# Patient Record
Sex: FEMALE | Race: BLACK OR AFRICAN AMERICAN | NOT HISPANIC OR LATINO | Employment: FULL TIME | URBAN - METROPOLITAN AREA
[De-identification: names, ages, dates, MRNs, and addresses within clinical notes are randomized per-mention and may not be internally consistent; named-entity substitution may affect disease eponyms.]

---

## 2021-03-25 ENCOUNTER — APPOINTMENT (OUTPATIENT)
Dept: GENERAL RADIOLOGY | Facility: HOSPITAL | Age: 51
End: 2021-03-25

## 2021-03-25 ENCOUNTER — HOSPITAL ENCOUNTER (EMERGENCY)
Facility: HOSPITAL | Age: 51
Discharge: HOME | End: 2021-03-25
Admitting: EMERGENCY MEDICINE

## 2021-03-25 VITALS
DIASTOLIC BLOOD PRESSURE: 108 MMHG | WEIGHT: 173.06 LBS | BODY MASS INDEX: 30.66 KG/M2 | SYSTOLIC BLOOD PRESSURE: 174 MMHG | HEIGHT: 63 IN | TEMPERATURE: 97.9 F | OXYGEN SATURATION: 96 % | RESPIRATION RATE: 16 BRPM | HEART RATE: 80 BPM

## 2021-03-25 DIAGNOSIS — M25.531 RIGHT WRIST PAIN: Primary | ICD-10-CM

## 2021-03-25 DIAGNOSIS — I10 HYPERTENSION, UNSPECIFIED TYPE: ICD-10-CM

## 2021-03-25 PROCEDURE — 73110 X-RAY EXAM OF WRIST: CPT

## 2021-03-25 PROCEDURE — 96372 THER/PROPH/DIAG INJ SC/IM: CPT

## 2021-03-25 PROCEDURE — 99283 EMERGENCY DEPT VISIT LOW MDM: CPT

## 2021-03-25 PROCEDURE — 25010000002 KETOROLAC TROMETHAMINE PER 15 MG: Performed by: PHYSICIAN ASSISTANT

## 2021-03-25 RX ORDER — CLONIDINE HYDROCHLORIDE 0.1 MG/1
0.1 TABLET ORAL ONCE
Status: COMPLETED | OUTPATIENT
Start: 2021-03-25 | End: 2021-03-25

## 2021-03-25 RX ORDER — KETOROLAC TROMETHAMINE 15 MG/ML
15 INJECTION, SOLUTION INTRAMUSCULAR; INTRAVENOUS ONCE
Status: COMPLETED | OUTPATIENT
Start: 2021-03-25 | End: 2021-03-25

## 2021-03-25 RX ORDER — NAPROXEN 500 MG/1
500 TABLET ORAL 2 TIMES DAILY PRN
Qty: 14 TABLET | Refills: 0 | Status: SHIPPED | OUTPATIENT
Start: 2021-03-25 | End: 2021-06-11

## 2021-03-25 RX ADMIN — CLONIDINE HYDROCHLORIDE 0.1 MG: 0.1 TABLET ORAL at 15:52

## 2021-03-25 RX ADMIN — KETOROLAC TROMETHAMINE 15 MG: 15 INJECTION, SOLUTION INTRAMUSCULAR; INTRAVENOUS at 15:56

## 2021-03-25 NOTE — ED PROVIDER NOTES
Subjective   Chief Complaint: Wrist pain    Patient is a 50-year-old -American female with history of CVA, asthma, carpal tunnel presents the ER chief complaint of right wrist pain for 1 month.  Patient denies any known falls or trauma or injury.  Patient states that she had carpal tunnel surgery in 2004 by a physician in [place].  Patient states that she has not taken any medication for her pain in the last month.  Patient also states she has not taken her blood pressure medication in the last 3 days.  Patient states the pain is in her wrist and forearm, described as a constant sharp pain that she rates 9/10.  She denies any numbness or tingling.  She denies any weakness.  Patient denies any right shoulder pain, neck pain.  She denies any fever or chills.  Patient denies any chest pain shortness of breath headache or blurry vision.    Location: Right wrist    Quality: Sharp    Duration: 1 month    Timing: Constant    Severity: Moderate    Associated Symptoms: None    PCP: None      History provided by:  Patient      Review of Systems   Constitutional: Negative for chills and fever.   HENT: Negative for sore throat and trouble swallowing.    Respiratory: Negative for shortness of breath and wheezing.    Cardiovascular: Negative for chest pain.   Gastrointestinal: Negative for abdominal pain, diarrhea, nausea and vomiting.   Genitourinary: Negative for dysuria.   Musculoskeletal: Positive for arthralgias.        Right wrist pain   Skin: Negative for color change and rash.   Neurological: Negative for weakness, numbness and headaches.   Psychiatric/Behavioral: Negative for behavioral problems.   All other systems reviewed and are negative.      No past medical history on file.    Allergies   Allergen Reactions   • Codone [Hydrocodone] Anxiety   • Compazine [Prochlorperazine] Anxiety       No past surgical history on file.    No family history on file.    Social History     Socioeconomic History   • Marital  "status:      Spouse name: Not on file   • Number of children: Not on file   • Years of education: Not on file   • Highest education level: Not on file           Objective   Physical Exam  Vitals reviewed.   Constitutional:       Appearance: Normal appearance. She is well-developed. She is not ill-appearing or toxic-appearing.   HENT:      Head: Normocephalic and atraumatic.   Eyes:      Pupils: Pupils are equal, round, and reactive to light.   Cardiovascular:      Rate and Rhythm: Normal rate and regular rhythm.      Heart sounds: Normal heart sounds.   Pulmonary:      Effort: Pulmonary effort is normal. No respiratory distress.      Breath sounds: Normal breath sounds. No wheezing.   Abdominal:      General: Bowel sounds are normal. There is no distension.      Palpations: Abdomen is soft.      Tenderness: There is no abdominal tenderness.   Musculoskeletal:         General: Tenderness present. No swelling or deformity. Normal range of motion.      Right lower leg: No edema.      Left lower leg: No edema.      Comments: Tenderness palpation posterior aspect of right forearm and right wrist with no significant erythema or edema.  Patient full rage of motion of all digits on right hand  Tinel's sign negative, Phalen sign negative   Skin:     General: Skin is warm and dry.      Capillary Refill: Capillary refill takes less than 2 seconds.      Findings: No rash.   Neurological:      General: No focal deficit present.      Mental Status: She is alert and oriented to person, place, and time.   Psychiatric:         Mood and Affect: Mood normal.         Behavior: Behavior normal.         Procedures           ED Course    BP (!) 174/108 (BP Location: Left arm, Patient Position: Sitting)   Pulse 80   Temp 97.9 °F (36.6 °C) (Oral)   Resp 16   Ht 160 cm (63\")   Wt 78.5 kg (173 lb 1 oz)   SpO2 96%   BMI 30.66 kg/m²   Labs Reviewed - No data to display  Medications   ketorolac (TORADOL) injection 15 mg (15 mg " Intramuscular Given 3/25/21 1556)   cloNIDine (CATAPRES) tablet 0.1 mg (0.1 mg Oral Given 3/25/21 1552)     XR Wrist 3+ View Right    Result Date: 3/25/2021  Moderate degenerative change of the distal radial ulnar joint and moderate degenerative narrowing of the radiocarpal joint. No acute right wrist findings.  Electronically Signed By-Latanya Hubbard MD On:3/25/2021 3:45 PM This report was finalized on [id number] by  Latanya Hubbard MD.                                           MDM  Number of Diagnoses or Management Options  Hypertension, unspecified type  Right wrist pain  Diagnosis management comments: MEDICAL DECISION  Epic Chart Review:  Comorbidities: Hypertension, previous CVA, asthma, carpal tunnel  Differentials: Fracture, contusion, tendinitis, carpal tunnel; this list is not all inclusive and does not constitute the entirety of considered causes  Radiology interpretation:  Images reviewed by me and interpreted by radiologist,   X-ray right wrist shows moderate degenerative change of the distal radial ulnar joint and moderate degenerative narrowing of the radiocarpal joint  Lab interpretation: Not warranted    While in the ED appropriate PPE was worn during exam and throughout all encounters with the patient.  Patient had the above evaluation.  Patient given Toradol 15 mg IM for pain.  Patient given clonidine for her hypertension.  Patient's only complaint is right wrist pain, right forearm pain.  There is no obvious swelling, erythema, deformity.  X-ray right wrist is negative for acute osseous abnormalities.  Patient was placed in a wrist cock-up splint.  Patient is neurovascular intact distally post splint placement.  Patient's blood pressure improved to 174/108.  Patient denies any chest pain, shortness of breath, headache dizziness or blurry vision.  Patient states that she has not taken her blood pressure medication in 3 days.  Discussed thoroughly with patient regarding importance of taking blood  pressure medication as prescribed as it can lead to heart disease, stroke, patient verbalizes understanding.  Patient will follow up with primary care and orthopedic specialist for further management evaluation.  Patient discharged with prescription for naproxen for pain.    Discharge plan and instructions were discussed with the patient who verbalized understanding and is in agreement with the plan, all questions were answered at this time.  Patient is aware of signs symptoms that would require immediate return to the emergency room.  Patient understands importance of following up with primary care provider for further evaluation and worsening concerns as well as blood pressure recheck in the next 4 weeks.    Patient remained afebrile, nontoxic-appearing, no acute respiratory distress throughout entire emergency room stay.  Patient was discharged in improved stable condition with an upright steady gait.         Amount and/or Complexity of Data Reviewed  Tests in the radiology section of CPT®: reviewed and ordered    Patient Progress  Patient progress: stable      Final diagnoses:   Right wrist pain   Hypertension, unspecified type       ED Disposition  ED Disposition     ED Disposition Condition Comment    Discharge Stable           PATIENT CONNECTION - [place] [address]  [phone]  Schedule an appointment as soon as possible for a visit in 2 days  As needed, If symptoms worsen    Joseph Alegre MD  [address] [place] [address] [place] [facility]  [phone]    Schedule an appointment as soon as possible for a visit in 2 days  As needed, If symptoms worsen    Trenton Uriostegui MD  [address] [place] [address] [place] [facility]  [phone]    Schedule an appointment as soon as possible for a visit in 2 days  As needed, If symptoms worsen         Medication List      New Prescriptions    naproxen 500 MG EC tablet  Commonly known as: EC NAPROSYN  Take 1 tablet by mouth 2 (Two) Times a Day As  Needed for Mild Pain .           Where to Get Your Medications      These medications were sent to [facility]/pharmacy #[id number] - [place], [place] - [address] [facility] [phone]  - [phone]   [address] [facility] [place] [address]    Hours: 24-hours Phone: [phone]   · naproxen 500 MG EC tablet          Katherin Cline PA  03/25/21 4931

## 2021-03-25 NOTE — ED NOTES
Pt A&Ox 4 with lungs CTA bases. Pt ambulatory at this time.   Discharge instruction given to pt with verbal understanding received.   Pt discharged with education, RX, and personal belongings.        Geovanni Miller, RN  03/25/21 9778

## 2021-03-25 NOTE — DISCHARGE INSTRUCTIONS
Take naproxen as needed for pain.  Wear wrist cock-up splint during activity, and especially at night  make sure to take your blood pressure medication as prescribed, your blood pressure was very high today    May apply ice to your arm or wrist to help with pain and swelling. Apply in 20-minute increments every few hours while awake    follow-up with primary care for further management evaluation, call one of the providers listed above to establish  follow-up with orthopedic specialist for further management and evaluation    turn to the ER for new or worsening symptoms

## 2021-03-25 NOTE — ED NOTES
C/o right wrist pain radiating up to her right elbow x 1 month, worse in the last week. Hx of carpal tunnel surgery in 2004.      Katherin Levine RN  03/25/21 152

## 2021-06-11 ENCOUNTER — OFFICE VISIT (OUTPATIENT)
Dept: FAMILY MEDICINE CLINIC | Facility: CLINIC | Age: 51
End: 2021-06-11

## 2021-06-11 ENCOUNTER — LAB (OUTPATIENT)
Dept: FAMILY MEDICINE CLINIC | Facility: CLINIC | Age: 51
End: 2021-06-11

## 2021-06-11 VITALS
WEIGHT: 180 LBS | HEIGHT: 63 IN | DIASTOLIC BLOOD PRESSURE: 95 MMHG | SYSTOLIC BLOOD PRESSURE: 146 MMHG | OXYGEN SATURATION: 99 % | BODY MASS INDEX: 31.89 KG/M2 | HEART RATE: 77 BPM

## 2021-06-11 DIAGNOSIS — I10 ESSENTIAL HYPERTENSION: Primary | ICD-10-CM

## 2021-06-11 DIAGNOSIS — Z11.59 NEED FOR HEPATITIS C SCREENING TEST: ICD-10-CM

## 2021-06-11 DIAGNOSIS — E66.09 CLASS 1 OBESITY DUE TO EXCESS CALORIES WITH SERIOUS COMORBIDITY AND BODY MASS INDEX (BMI) OF 31.0 TO 31.9 IN ADULT: ICD-10-CM

## 2021-06-11 PROBLEM — F32.9 MAJOR DEPRESSIVE DISORDER: Status: ACTIVE | Noted: 2017-01-04

## 2021-06-11 PROBLEM — G40.409 MYOCLONIC EPILEPSY: Status: ACTIVE | Noted: 2019-02-18

## 2021-06-11 PROBLEM — M70.62 TROCHANTERIC BURSITIS, LEFT HIP: Status: ACTIVE | Noted: 2018-04-09

## 2021-06-11 PROBLEM — M65.4 RADIAL STYLOID TENOSYNOVITIS OF RIGHT HAND: Status: ACTIVE | Noted: 2021-06-11

## 2021-06-11 PROBLEM — E66.9 OBESITY: Status: ACTIVE | Noted: 2020-09-19

## 2021-06-11 PROBLEM — L65.9 ALOPECIA: Status: ACTIVE | Noted: 2019-02-18

## 2021-06-11 PROBLEM — J45.909 ASTHMA: Status: ACTIVE | Noted: 2021-06-11

## 2021-06-11 LAB
ALBUMIN SERPL-MCNC: 4.4 G/DL (ref 3.5–5.2)
ALBUMIN/GLOB SERPL: 1.2 G/DL
ALP SERPL-CCNC: 68 U/L (ref 39–117)
ALT SERPL W P-5'-P-CCNC: 10 U/L (ref 1–33)
ANION GAP SERPL CALCULATED.3IONS-SCNC: 11.6 MMOL/L (ref 5–15)
AST SERPL-CCNC: 18 U/L (ref 1–32)
BASOPHILS # BLD AUTO: 0.02 10*3/MM3 (ref 0–0.2)
BASOPHILS NFR BLD AUTO: 0.3 % (ref 0–1.5)
BILIRUB SERPL-MCNC: 0.3 MG/DL (ref 0–1.2)
BUN SERPL-MCNC: 12 MG/DL (ref 6–20)
BUN/CREAT SERPL: 16.4 (ref 7–25)
CALCIUM SPEC-SCNC: 9.3 MG/DL (ref 8.6–10.5)
CHLORIDE SERPL-SCNC: 102 MMOL/L (ref 98–107)
CHOLEST SERPL-MCNC: 204 MG/DL (ref 0–200)
CO2 SERPL-SCNC: 27.4 MMOL/L (ref 22–29)
CREAT SERPL-MCNC: 0.73 MG/DL (ref 0.57–1)
DEPRECATED RDW RBC AUTO: 44.8 FL (ref 37–54)
EOSINOPHIL # BLD AUTO: 0.07 10*3/MM3 (ref 0–0.4)
EOSINOPHIL NFR BLD AUTO: 1.1 % (ref 0.3–6.2)
ERYTHROCYTE [DISTWIDTH] IN BLOOD BY AUTOMATED COUNT: 12 % (ref 12.3–15.4)
GFR SERPL CREATININE-BSD FRML MDRD: 103 ML/MIN/1.73
GLOBULIN UR ELPH-MCNC: 3.8 GM/DL
GLUCOSE SERPL-MCNC: 91 MG/DL (ref 65–99)
HCT VFR BLD AUTO: 35.2 % (ref 34–46.6)
HCV AB SER DONR QL: NORMAL
HDLC SERPL-MCNC: 72 MG/DL (ref 40–60)
HGB BLD-MCNC: 12.1 G/DL (ref 12–15.9)
IMM GRANULOCYTES # BLD AUTO: 0.03 10*3/MM3 (ref 0–0.05)
IMM GRANULOCYTES NFR BLD AUTO: 0.5 % (ref 0–0.5)
LDLC SERPL CALC-MCNC: 119 MG/DL (ref 0–100)
LDLC/HDLC SERPL: 1.63 {RATIO}
LYMPHOCYTES # BLD AUTO: 2.66 10*3/MM3 (ref 0.7–3.1)
LYMPHOCYTES NFR BLD AUTO: 42.5 % (ref 19.6–45.3)
MCH RBC QN AUTO: 34.9 PG (ref 26.6–33)
MCHC RBC AUTO-ENTMCNC: 34.4 G/DL (ref 31.5–35.7)
MCV RBC AUTO: 101.4 FL (ref 79–97)
MONOCYTES # BLD AUTO: 0.43 10*3/MM3 (ref 0.1–0.9)
MONOCYTES NFR BLD AUTO: 6.9 % (ref 5–12)
NEUTROPHILS NFR BLD AUTO: 3.05 10*3/MM3 (ref 1.7–7)
NEUTROPHILS NFR BLD AUTO: 48.7 % (ref 42.7–76)
NRBC BLD AUTO-RTO: 0 /100 WBC (ref 0–0.2)
PLATELET # BLD AUTO: 215 10*3/MM3 (ref 140–450)
PMV BLD AUTO: 11.7 FL (ref 6–12)
POTASSIUM SERPL-SCNC: 3.6 MMOL/L (ref 3.5–5.2)
PROT SERPL-MCNC: 8.2 G/DL (ref 6–8.5)
RBC # BLD AUTO: 3.47 10*6/MM3 (ref 3.77–5.28)
SODIUM SERPL-SCNC: 141 MMOL/L (ref 136–145)
TRIGL SERPL-MCNC: 75 MG/DL (ref 0–150)
VLDLC SERPL-MCNC: 13 MG/DL (ref 5–40)
WBC # BLD AUTO: 6.26 10*3/MM3 (ref 3.4–10.8)

## 2021-06-11 PROCEDURE — 85025 COMPLETE CBC W/AUTO DIFF WBC: CPT | Performed by: FAMILY MEDICINE

## 2021-06-11 PROCEDURE — 80053 COMPREHEN METABOLIC PANEL: CPT | Performed by: FAMILY MEDICINE

## 2021-06-11 PROCEDURE — 80061 LIPID PANEL: CPT | Performed by: FAMILY MEDICINE

## 2021-06-11 PROCEDURE — 86803 HEPATITIS C AB TEST: CPT | Performed by: FAMILY MEDICINE

## 2021-06-11 PROCEDURE — 36415 COLL VENOUS BLD VENIPUNCTURE: CPT | Performed by: FAMILY MEDICINE

## 2021-06-11 PROCEDURE — 99203 OFFICE O/P NEW LOW 30 MIN: CPT | Performed by: FAMILY MEDICINE

## 2021-06-11 RX ORDER — AMLODIPINE BESYLATE 10 MG/1
10 TABLET ORAL DAILY
Qty: 90 TABLET | Refills: 1 | Status: SHIPPED | OUTPATIENT
Start: 2021-06-11 | End: 2022-08-09 | Stop reason: SDUPTHER

## 2021-06-11 RX ORDER — AMLODIPINE BESYLATE 10 MG/1
TABLET ORAL
COMMUNITY
Start: 2021-04-29 | End: 2021-06-11 | Stop reason: SDUPTHER

## 2021-06-11 RX ORDER — HYDROCHLOROTHIAZIDE 25 MG/1
25 TABLET ORAL DAILY
Qty: 90 TABLET | Refills: 1 | Status: SHIPPED | OUTPATIENT
Start: 2021-06-11 | End: 2021-11-12

## 2021-06-11 NOTE — PROGRESS NOTES
"  Subjective:     Maira Benson is a 51 y.o. female who presents for  Chief Complaint   Patient presents with   • Hypertension   • Establish Care     Obese black female with CMHx of HTN presents to [facility] care. Hypertensive in office despite amlodipine 10 mg. Reports a history of hypertensive encephalopathy requiring hospitalization.     Past Medical Hx:  No past medical history on file.    Past Surgical Hx:  Past Surgical History:   Procedure Laterality Date   • ANKLE SURGERY     • CARPAL TUNNEL RELEASE     • GLAUCOMA SURGERY     • TUBAL ABDOMINAL LIGATION         Social History:  she  reports that she has been smoking cigars. She has never used smokeless tobacco. She reports current alcohol use. She reports that she does not use drugs.    Current Meds:    Current Outpatient Medications:   •  amLODIPine (NORVASC) 10 MG tablet, , Disp: , Rfl:       Review of Systems  Review of Systems    Objective:     /95 (BP Location: Left arm, Patient Position: Sitting, Cuff Size: Large Adult)   Pulse 77   Ht 160 cm (63\")   Wt 81.6 kg (180 lb)   SpO2 99%   BMI 31.89 kg/m²     Physical Exam  Vitals reviewed.   Constitutional:       General: She is not in acute distress.     Appearance: She is well-developed. She is obese. She is not diaphoretic.   HENT:      Head: Normocephalic and atraumatic.   Cardiovascular:      Rate and Rhythm: Normal rate and regular rhythm.   Pulmonary:      Effort: Pulmonary effort is normal.      Breath sounds: Normal breath sounds.   Musculoskeletal:      Right lower leg: No edema.      Left lower leg: No edema.   Skin:     General: Skin is warm and dry.   Neurological:      Mental Status: She is alert and oriented to person, place, and time.   Psychiatric:         Mood and Affect: Mood normal.         Behavior: Behavior normal.         Lab Results   Component Value Date    WBC 7.08 09/19/2020    HGB 11.9 (L) 09/19/2020    HCT 36.5 09/19/2020    .8 (H) 09/19/2020     09/19/2020 "     Lab Results   Component Value Date    BUN 9 09/19/2020    CREATININE 0.70 09/19/2020    BCR 12.9 09/19/2020    K 3.8 09/19/2020    CO2 29 09/19/2020    CALCIUM 9.3 09/19/2020    ALBUMIN 4.5 09/19/2020    LABIL2 1.1 09/19/2020    AST 23 09/19/2020    ALT 13 09/19/2020     No results found for: CHOL, CHLPL, TRIG, HDL, LDL, LDLDIRECT    The ASCVD Risk score (Catharpin SERGIO Jr., et al., 2013) failed to calculate for the following reasons:    Cannot find a previous HDL lab    Cannot find a previous total cholesterol lab     Assessment/Plan:     Problem List Items Addressed This Visit        Cardiac and Vasculature    Essential hypertension - Primary    Overview     BP not at goal, <140/90.  Encouraged low-Na+ diet & 150 min exercise/week.   Continue amlodipine 10 mg.  Start hydrochlorothiazide 25 mg.  Monitoring renal function.         Relevant Medications    hydroCHLOROthiazide (HYDRODIURIL) 25 MG tablet    amLODIPine (NORVASC) 10 MG tablet    Other Relevant Orders    CBC Auto Differential    Comprehensive Metabolic Panel    Lipid Panel       Endocrine and Metabolic    Class 1 obesity due to excess calories with serious comorbidity and body mass index (BMI) of 31.0 to 31.9 in adult    Overview     Discussed health risks associated with obesity.  Encouraged 150 minutes of exercise weekly.           Other Visit Diagnoses     Need for hepatitis C screening test        Relevant Orders    Hepatitis C Antibody           Follow-up:     Return in about 8 weeks (around 8/6/2021) for Annual Physical Exam, Pap Smear.

## 2021-06-11 NOTE — PATIENT INSTRUCTIONS
Mediterranean Diet  A Mediterranean diet refers to food and lifestyle choices that are based on the traditions of countries located on the [place]. This way of eating has been shown to help prevent certain conditions and improve outcomes for people who have chronic diseases, like kidney disease and heart disease.  What are tips for following this plan?  Lifestyle  · Cook and eat meals together with your family, when possible.  · Drink enough fluid to keep your urine clear or pale yellow.  · Be physically active every day. This includes:  ? Aerobic exercise like running or swimming.  ? Leisure activities like gardening, walking, or housework.  · Get 7-8 hours of sleep each night.  · If recommended by your health care provider, drink red wine in moderation. This means 1 glass a day for nonpregnant women and 2 glasses a day for men. A glass of wine equals 5 oz (150 mL).  Reading food labels    · Check the serving size of packaged foods. For foods such as rice and pasta, the serving size refers to the amount of cooked product, not dry.  · Check the total fat in packaged foods. Avoid foods that have saturated fat or trans fats.  · Check the ingredients list for added sugars, such as corn syrup.  Shopping  · At the grocery store, buy most of your food from the areas near the walls of the store. This includes:  ? Fresh fruits and vegetables (produce).  ? Grains, beans, nuts, and seeds. Some of these may be available in unpackaged forms or large amounts (in bulk).  ? Fresh seafood.  ? Poultry and eggs.  ? Low-fat dairy products.  · Buy whole ingredients instead of prepackaged foods.  · Buy fresh fruits and vegetables in-season from local farmers markets.  · Buy frozen fruits and vegetables in resealable bags.  · If you do not have access to quality fresh seafood, buy precooked frozen shrimp or canned fish, such as tuna, salmon, or sardines.  · Buy small amounts of raw or cooked vegetables, salads, or olives from  the deli or salad bar at your store.  · Stock your pantry so you always have certain foods on hand, such as olive oil, canned tuna, canned tomatoes, rice, pasta, and beans.  Cooking  · Cook foods with extra-virgin olive oil instead of using butter or other vegetable oils.  · Have meat as a side dish, and have vegetables or grains as your main dish. This means having meat in small portions or adding small amounts of meat to foods like pasta or stew.  · Use beans or vegetables instead of meat in common dishes like chili or lasagna.  · [place] with different cooking methods. Try roasting or broiling vegetables instead of steaming or sautéeing them.  · Add frozen vegetables to soups, stews, pasta, or rice.  · Add nuts or seeds for added healthy fat at each meal. You can add these to yogurt, salads, or vegetable dishes.  · Marinate fish or vegetables using olive oil, lemon juice, garlic, and fresh herbs.  Meal planning    · Plan to eat 1 vegetarian meal one day each week. Try to work up to 2 vegetarian meals, if possible.  · Eat seafood 2 or more times a week.  · Have healthy snacks readily available, such as:  ? Vegetable sticks with hummus.  ? Greek yogurt.  ? Fruit and nut trail mix.  · Eat balanced meals throughout the week. This includes:  ? Fruit: 2-3 servings a day  ? Vegetables: 4-5 servings a day  ? Low-fat dairy: 2 servings a day  ? Fish, poultry, or lean meat: 1 serving a day  ? Beans and legumes: 2 or more servings a week  ? Nuts and seeds: 1-2 servings a day  ? Whole grains: 6-8 servings a day  ? Extra-virgin olive oil: 3-4 servings a day  · Limit red meat and sweets to only a few servings a month  What are my food choices?  · Mediterranean diet  ? Recommended  § Grains: Whole-grain pasta. Brown rice. Bulgar wheat. Polenta. Couscous. Whole-wheat bread. Oatmeal. Quinoa.  § Vegetables: Artichokes. Beets. Broccoli. Cabbage. Carrots. Eggplant. Green beans. Chard. Kale. Spinach. Onions. Leeks. Peas. Squash.  Tomatoes. Peppers. Radishes.  § Fruits: Apples. Apricots. Avocado. Berries. Bananas. Cherries. Dates. Figs. Grapes. John. Melon. Oranges. Peaches. Plums. Pomegranate.  § Meats and other protein foods: Beans. Almonds. Sunflower seeds. Pine nuts. Peanuts. Cod. [place]. Scallops. Shrimp. Tuna. Tilapia. Clams. Oysters. Eggs.  § Dairy: Low-fat milk. Cheese. Greek yogurt.  § Beverages: Water. Red wine. Herbal tea.  § Fats and oils: Extra virgin olive oil. Avocado oil. Grape seed oil.  § Sweets and desserts: Greek yogurt with honey. Baked apples. Poached pears. Trail mix.  § Seasoning and other foods: Basil. Cilantro. Coriander. Cumin. Mint. Parsley. [place]obi. Rosemary. Tarragon. Garlic. Oregano. Thyme. Pepper. Balsalmic vinegar. Tahini. Hummus. Tomato sauce. Olives. Mushrooms.  ? Limit these  § Grains: Prepackaged pasta or rice dishes. Prepackaged cereal with added sugar.  § Vegetables: Deep fried potatoes (french fries).  § Fruits: Fruit canned in syrup.  § Meats and other protein foods: Beef. Pork. Lamb. Poultry with skin. Hot dogs. Schmid.  § Dairy: Ice cream. Sour cream. Whole milk.  § Beverages: Juice. Sugar-sweetened soft drinks. Beer. Liquor and spirits.  § Fats and oils: Butter. Canola oil. Vegetable oil. Beef fat (tallow). Lard.  § Sweets and desserts: Cookies. Cakes. Pies. Candy.  § Seasoning and other foods: Mayonnaise. Premade sauces and marinades.  The items listed may not be a complete list. Talk with your dietitian about what dietary choices are right for you.  Summary  · The Mediterranean diet includes both food and lifestyle choices.  · Eat a variety of fresh fruits and vegetables, beans, nuts, seeds, and whole grains.  · Limit the amount of red meat and sweets that you eat.  · Talk with your health care provider about whether it is safe for you to drink red wine in moderation. This means 1 glass a day for nonpregnant women and 2 glasses a day for men. A glass of wine equals 5 oz (150 mL).  This information  is not intended to replace advice given to you by your health care provider. Make sure you discuss any questions you have with your health care provider.  Document Revised: 08/17/2017 Document Reviewed: 08/10/2017  [employer] Patient Education © 2020 [employer]      Exercising to Stay Healthy  To become healthy and stay healthy, it is recommended that you do moderate-intensity and vigorous-intensity exercise. You can tell that you are exercising at a moderate intensity if your heart starts beating faster and you start breathing faster but can still hold a conversation. You can tell that you are exercising at a vigorous intensity if you are breathing much harder and faster and cannot hold a conversation while exercising.  Exercising regularly is important. It has many health benefits, such as:  · Improving overall fitness, flexibility, and endurance.  · Increasing bone density.  · Helping with weight control.  · Decreasing body fat.  · Increasing muscle strength.  · Reducing stress and tension.  · Improving overall health.  How often should I exercise?  Choose an activity that you enjoy, and set realistic goals. Your health care provider can help you make an activity plan that works for you.  Exercise regularly as told by your health care provider. This may include:  · Doing strength training two times a week, such as:  ? Lifting weights.  ? Using resistance bands.  ? Push-ups.  ? Sit-ups.  ? Yoga.  · Doing a certain intensity of exercise for a given amount of time. Choose from these options:  ? A total of 150 minutes of moderate-intensity exercise every week.  ? A total of 75 minutes of vigorous-intensity exercise every week.  ? A mix of moderate-intensity and vigorous-intensity exercise every week.  Children, pregnant women, people who have not exercised regularly, people who are overweight, and older adults may need to talk with a health care provider about what activities are safe to do. If you have a medical  condition, be sure to talk with your health care provider before you start a new exercise program.  What are some exercise ideas?  Moderate-intensity exercise ideas include:  · Walking 1 mile (1.6 km) in about 15 minutes.  · Biking.  · Hiking.  · Golfing.  · Dancing.  · Water aerobics.  Vigorous-intensity exercise ideas include:  · Walking 4.5 miles (7.2 km) or more in about 1 hour.  · Jogging or running 5 miles (8 km) in about 1 hour.  · Biking 10 miles (16.1 km) or more in about 1 hour.  · Lap swimming.  · Roller-skating or in-line skating.  · Cross-country skiing.  · Vigorous competitive sports, such as football, basketball, and soccer.  · Jumping rope.  · Aerobic dancing.  What are some everyday activities that can help me to get exercise?  · Yard work, such as:  ? Pushing a .  ? Raking and bagging leaves.  · Washing your car.  · Pushing a stroller.  · Shoveling snow.  · Gardening.  · Washing windows or floors.  How can I be more active in my day-to-day activities?  · Use stairs instead of an elevator.  · Take a walk during your lunch break.  · If you drive, park your car farther away from your work or school.  · If you take public transportation, get off one stop early and walk the rest of the way.  · Stand up or walk around during all of your indoor phone calls.  · Get up, stretch, and walk around every 30 minutes throughout the day.  · Enjoy exercise with a friend. Support to continue exercising will help you keep a regular routine of activity.  What guidelines can I follow while exercising?  · Before you start a new exercise program, talk with your health care provider.  · Do not exercise so much that you hurt yourself, feel dizzy, or get very short of breath.  · Wear comfortable clothes and wear shoes with good support.  · Drink plenty of water while you exercise to prevent dehydration or heat stroke.  · Work out until your breathing and your heartbeat get faster.  Where to find more  information  · [place] Department of Health and Human Services: www.hhs.gov  · Centers for Disease Control and Prevention (CDC): www.cdc.gov  Summary  · Exercising regularly is important. It will improve your overall fitness, flexibility, and endurance.  · Regular exercise also will improve your overall health. It can help you control your weight, reduce stress, and improve your bone density.  · Do not exercise so much that you hurt yourself, feel dizzy, or get very short of breath.  · Before you start a new exercise program, talk with your health care provider.  This information is not intended to replace advice given to you by your health care provider. Make sure you discuss any questions you have with your health care provider.  Document Revised: 11/30/2018 Document Reviewed: 11/08/2018  [employer] Patient Education © 2021 [employer]       ADVANCE CARE PLANNING  Conversations that Matter  PLANNING GUIDE    LOOKING BACK ...  Who we are, what we believe, and what we value are all shaped by experiences we have had. Taoist, family traditions, jobs and friends affect us deeply.    Has anything happened in your past that shaped your feelings about medical treatment?    Think about an experience you may have had with a family member or friend who was faced with a decision about medical care near the end of life. What was positive about that experience? What do you wish would have been done differently?    HERE AND NOW ...  Do you have any significant health problems now? What kinds of things bring you such curtis that, should a health problem prevent you from doing them any more, life would have little meaning? What short- or long-term goals do you have? How might medical treatment help you or hinder you in accomplishing those goals?    WHAT ABOUT TOMORROW?  What significant health problems do you fear may affect you in the future? How do you feel about the possibility of having to go to a nursing home? How would decisions  "be made if you could not make them?    WHO SHOULD MAKE DECISIONS?  An important part of planning is to consider whether or not you could appoint someone to make your healthcare decisions if you could not make them yourself. Many people select a close family member, but you are free to pick anyone you think could best represent you. Whoever you appoint should have all of the following qualifications:    • Can be trusted.  • Is willing to accept this responsibility.  • Is willing to follow the values and instructions you have discussed.  • Is able to make complex, difficult decisions.    It is helpful, but not required, to appoint one or more alternate persons in case your first choice becomes unable or unwilling to represent you. It is best if only one person has authority at a time, but you can instruct your representatives to discuss decisions together if time permits.    WHAT FUTURE DECISIONS NEED TO BE CONSIDERED?  Providing instructions for future healthcare decisions may seem like an impossible task. How can anyone plan for all the possibilities? You cannot … but you do not have to.    You need to plan for situations where you:  1. Become unexpectedly incapable of making your own decisions  2. It is clear you will have little or no recovery, and  3. The injury or loss of function is significant.    Such a situation might arise because of an injury to the brain from an accident, a stroke, or a slowly progressive disease such as Alzheimer's.    To plan for this type of situation, many people state, \"If I'm going to be a vegetable, let me go,\" or \"No heroics,\" or \"Don't keep me alive on machines.\" While these remarks are a beginning, they simply are too vague to guide decision-making.    You need to completely describe under what circumstances your goals for medical care should be changed from attempting to prolong life to being allowed to die. In some situations, certain treatments may not make sense because they " will not help, but other treatments will be of important benefit.    Consider these three questions:  1. When would it make sense to continue certain treatments in an effort to prolong life and seek recovery?  2. When would it make sense to stop or withhold certain treatments and accept death when it comes?  3. Under any circumstance, what kind of comfort care would you want, including medication, spiritual and environmental options?    Making these choices requires understanding the information, weighing the benefits and burdens from your perspective, and then discussing your choices with those closest to you.    WHAT'S NEXT?  How do you make sure that your choices are respected? First talk, about them with your family, friends, clergy and physician, then put your choices in writing. Information about putting your plans into writing -- in an advance directive -- is available from your healthcare organization or .    Do you have any significant health problems? What health problems do you fear in the future?     Consider what frightens you most about medical treatment. What role does Rest[place], safia or spirituality play in how you live your life? How does cost influence your decisions about medical care?   In terms of future medical care, under what circumstances would you want the goals of medical treatment to switch from attempting to prolong life to focusing on comfort?     Describe these circumstances in as much detail as possible.   Ask yourself, what will most help me live well at this point in my life?     Share your views with the person or people who would make your medical decisions if you could not make them.     THERE'S NO EASY WAY TO PLAN FOR FUTURE HEALTHCARE CHOICES.  It's a process that involves thinking and talking about complex and sensitive issues.    The questions that follow will help in the advance care planning process. This is a guide for your own benefit; it's not a test, and there  are no right or wrong answers. It does not need to be completed all at once. You may use it to share your feelings with healthcare providers, your family and your friends. The answers to these questions will help those you love make choices for you when you cannot make them yourself.    These are things I need to tell my loved ones:  What is your idea of comfort care? Describe how you would want medications to be used to provide comfort. What type of spiritual care would you want?     I need to learn about: ____________________________  I need to ask my healthcare provider: ________________

## 2021-06-14 DIAGNOSIS — E78.2 MIXED HYPERLIPIDEMIA: Primary | ICD-10-CM

## 2021-06-14 RX ORDER — ATORVASTATIN CALCIUM 20 MG/1
20 TABLET, FILM COATED ORAL NIGHTLY
Qty: 90 TABLET | Refills: 1 | Status: SHIPPED | OUTPATIENT
Start: 2021-06-14 | End: 2021-11-12 | Stop reason: SINTOL

## 2021-11-12 ENCOUNTER — TELEPHONE (OUTPATIENT)
Dept: FAMILY MEDICINE CLINIC | Facility: CLINIC | Age: 51
End: 2021-11-12

## 2021-11-12 ENCOUNTER — OFFICE VISIT (OUTPATIENT)
Dept: FAMILY MEDICINE CLINIC | Facility: CLINIC | Age: 51
End: 2021-11-12

## 2021-11-12 VITALS
BODY MASS INDEX: 33.66 KG/M2 | HEIGHT: 63 IN | DIASTOLIC BLOOD PRESSURE: 103 MMHG | TEMPERATURE: 98 F | SYSTOLIC BLOOD PRESSURE: 175 MMHG | WEIGHT: 190 LBS | HEART RATE: 76 BPM | OXYGEN SATURATION: 96 %

## 2021-11-12 DIAGNOSIS — I10 ESSENTIAL HYPERTENSION: Primary | ICD-10-CM

## 2021-11-12 DIAGNOSIS — E78.2 MIXED HYPERLIPIDEMIA: ICD-10-CM

## 2021-11-12 DIAGNOSIS — E66.09 CLASS 1 OBESITY DUE TO EXCESS CALORIES WITH SERIOUS COMORBIDITY AND BODY MASS INDEX (BMI) OF 31.0 TO 31.9 IN ADULT: ICD-10-CM

## 2021-11-12 PROCEDURE — 99214 OFFICE O/P EST MOD 30 MIN: CPT | Performed by: FAMILY MEDICINE

## 2021-11-12 RX ORDER — CHLORTHALIDONE 25 MG/1
25 TABLET ORAL DAILY
Qty: 90 TABLET | Refills: 1 | Status: SHIPPED | OUTPATIENT
Start: 2021-11-12 | End: 2022-03-23 | Stop reason: SDUPTHER

## 2021-11-12 RX ORDER — ROSUVASTATIN CALCIUM 5 MG/1
5 TABLET, COATED ORAL NIGHTLY
Qty: 90 TABLET | Refills: 1 | Status: SHIPPED | OUTPATIENT
Start: 2021-11-12 | End: 2022-08-15 | Stop reason: SDUPTHER

## 2021-11-12 NOTE — TELEPHONE ENCOUNTER
Caller: Maira Benson    Relationship: Self    Best call back number: [phone]    What form or medical record are you requesting: DOCTORS NOTE    Who is requesting this form or medical record from you: WORK    How would you like to receive the form or medical records (pick-up, mail, fax): PATIENT WOULD LIKE TO  TODAY.     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    PATIENT WOULD LIKE TO  TODAY BECAUSE SHE HAS TO WORK TOMORROW    PLEASE ADVISE PATIENT

## 2021-11-12 NOTE — TELEPHONE ENCOUNTER
THERE IS NO HUB TO READ MESSAGE FOR ME TO READ TO THE PATIENT.  SHE IS RETURNING A CALL TO THE OFFICE, I ATTEMPTED TO CONNECT HER BUT NO ONE ANSWERED, SHE WANTS TO BE CALLED BACK AT  [phone]

## 2021-11-12 NOTE — TELEPHONE ENCOUNTER
Please call patient and let her know that I cannot write this letter.    She does not have any mental or physical disability keeping her from wearing a mask.

## 2021-11-12 NOTE — TELEPHONE ENCOUNTER
There is a hub to read message in previous note.   Spoke with patient. Pt notified. She has appt today at 2:00.

## 2021-11-12 NOTE — PATIENT INSTRUCTIONS
"   DASH Eating Plan  DASH stands for Dietary Approaches to Stop Hypertension. The DASH eating plan is a healthy eating plan that has been shown to:  · Reduce high blood pressure (hypertension).  · Reduce your risk for type 2 diabetes, heart disease, and stroke.  · Help with weight loss.  What are tips for following this plan?  Reading food labels  · Check food labels for the amount of salt (sodium) per serving. Choose foods with less than 5 percent of the Daily Value of sodium. Generally, foods with less than 300 milligrams (mg) of sodium per serving fit into this eating plan.  · To find whole grains, look for the word \"whole\" as the first word in the ingredient list.  Shopping  · Buy products labeled as \"low-sodium\" or \"no salt added.\"  · Buy fresh foods. Avoid canned foods and pre-made or frozen meals.  Cooking  · Avoid adding salt when cooking. Use salt-free seasonings or herbs instead of table salt or sea salt. Check with your health care provider or pharmacist before using salt substitutes.  · Do not thorpe foods. Cook foods using healthy methods such as baking, boiling, grilling, roasting, and broiling instead.  · Cook with heart-healthy oils, such as olive, canola, avocado, soybean, or sunflower oil.  Meal planning    · Eat a balanced diet that includes:  ? 4 or more servings of fruits and 4 or more servings of vegetables each day. Try to fill one-half of your plate with fruits and vegetables.  ? 6-8 servings of whole grains each day.  ? Less than 6 oz (170 g) of lean meat, poultry, or fish each day. A 3-oz (85-g) serving of meat is about the same size as a deck of cards. One egg equals 1 oz (28 g).  ? 2-3 servings of low-fat dairy each day. One serving is 1 cup (237 mL).  ? 1 serving of nuts, seeds, or beans 5 times each week.  ? 2-3 servings of heart-healthy fats. Healthy fats called omega-3 fatty acids are found in foods such as walnuts, flaxseeds, fortified milks, and eggs. These fats are also found in " cold-water fish, such as sardines, salmon, and mackerel.  · Limit how much you eat of:  ? Canned or prepackaged foods.  ? Food that is high in trans fat, such as some fried foods.  ? Food that is high in saturated fat, such as fatty meat.  ? Desserts and other sweets, sugary drinks, and other foods with added sugar.  ? Full-fat dairy products.  · Do not salt foods before eating.  · Do not eat more than 4 egg yolks a week.  · Try to eat at least 2 vegetarian meals a week.  · Eat more home-cooked food and less restaurant, buffet, and fast food.    Lifestyle  · When eating at a restaurant, ask that your food be prepared with less salt or no salt, if possible.  · If you drink alcohol:  ? Limit how much you use to:  § 0-1 drink a day for women who are not pregnant.  § 0-2 drinks a day for men.  ? Be aware of how much alcohol is in your drink. In the [place] one drink equals one 12 oz bottle of beer (355 mL), one 5 oz glass of wine (148 mL), or one 1½ oz glass of hard liquor (44 mL).  General information  · Avoid eating more than 2,300 mg of salt a day. If you have hypertension, you may need to reduce your sodium intake to 1,500 mg a day.  · Work with your health care provider to maintain a healthy body weight or to lose weight. Ask what an ideal weight is for you.  · Get at least 30 minutes of exercise that causes your heart to beat faster (aerobic exercise) most days of the week. Activities may include walking, swimming, or biking.  · Work with your health care provider or dietitian to adjust your eating plan to your individual calorie needs.  What foods should I eat?  Fruits  All fresh, dried, or frozen fruit. Canned fruit in natural juice (without added sugar).  Vegetables  Fresh or frozen vegetables (raw, steamed, roasted, or grilled). Low-sodium or reduced-sodium tomato and vegetable juice. Low-sodium or reduced-sodium tomato sauce and tomato paste. Low-sodium or reduced-sodium canned vegetables.  Grains  Whole-grain  or whole-wheat bread. Whole-grain or whole-wheat pasta. Brown rice. Oatmeal. Quinoa. Bulgur. Whole-grain and low-sodium cereals. Noa bread. Low-fat, low-sodium crackers. Whole-wheat flour tortillas.  Meats and other proteins  Skinless chicken or turkey. Ground chicken or turkey. Pork with fat trimmed off. Fish and seafood. Egg whites. Dried beans, peas, or lentils. Unsalted nuts, nut butters, and seeds. Unsalted canned beans. Lean cuts of beef with fat trimmed off. Low-sodium, lean precooked or cured meat, such as sausages or meat loaves.  Dairy  Low-fat (1%) or fat-free (skim) milk. Reduced-fat, low-fat, or fat-free cheeses. Nonfat, low-sodium ricotta or cottage cheese. Low-fat or nonfat yogurt. Low-fat, low-sodium cheese.  Fats and oils  Soft margarine without trans fats. Vegetable oil. Reduced-fat, low-fat, or light mayonnaise and salad dressings (reduced-sodium). Canola, safflower, olive, avocado, soybean, and sunflower oils. Avocado.  Seasonings and condiments  Herbs. Spices. Seasoning mixes without salt.  Other foods  Unsalted popcorn and pretzels. Fat-free sweets.  The items listed above may not be a complete list of foods and beverages you can eat. Contact a dietitian for more information.  What foods should I avoid?  Fruits  Canned fruit in a light or heavy syrup. Fried fruit. Fruit in cream or butter sauce.  Vegetables  Creamed or fried vegetables. Vegetables in a cheese sauce. Regular canned vegetables (not low-sodium or reduced-sodium). Regular canned tomato sauce and paste (not low-sodium or reduced-sodium). Regular tomato and vegetable juice (not low-sodium or reduced-sodium). Pickles. Olives.  Grains  Baked goods made with fat, such as croissants, muffins, or some breads. Dry pasta or rice meal packs.  Meats and other proteins  Fatty cuts of meat. Ribs. Fried meat. Schmid. Bologna, salami, and other precooked or cured meats, such as sausages or meat loaves. Fat from the back of a pig (fatback).  Bratwurst. Salted nuts and seeds. Canned beans with added salt. Canned or smoked fish. Whole eggs or egg yolks. Chicken or turkey with skin.  Dairy  Whole or 2% milk, cream, and half-and-half. Whole or full-fat cream cheese. Whole-fat or sweetened yogurt. Full-fat cheese. Nondairy creamers. Whipped toppings. Processed cheese and cheese spreads.  Fats and oils  Butter. Stick margarine. Lard. Shortening. Ghee. Schmid fat. Tropical oils, such as coconut, palm kernel, or palm oil.  Seasonings and condiments  Onion salt, garlic salt, seasoned salt, table salt, and sea salt. Worcestershire sauce. Tartar sauce. Barbecue sauce. Teriyaki sauce. Soy sauce, including reduced-sodium. Steak sauce. Canned and packaged gravies. Fish sauce. Oyster sauce. Cocktail sauce. Store-bought horseradish. Ketchup. Mustard. Meat flavorings and tenderizers. Bouillon cubes. Hot sauces. Pre-made or packaged marinades. Pre-made or packaged taco seasonings. Relishes. Regular salad dressings.  Other foods  Salted popcorn and pretzels.  The items listed above may not be a complete list of foods and beverages you should avoid. Contact a dietitian for more information.  Where to find more information  · National Heart, Lung, and Blood [place]: www.nhlbi.nih.gov  · American Heart Association: www.heart.org  · Academy of Nutrition and Dietetics: www.eatright.org  · National Kidney Foundation: www.kidney.org  Summary  · The DASH eating plan is a healthy eating plan that has been shown to reduce high blood pressure (hypertension). It may also reduce your risk for type 2 diabetes, heart disease, and stroke.  · When on the DASH eating plan, aim to eat more fresh fruits and vegetables, whole grains, lean proteins, low-fat dairy, and heart-healthy fats.  · With the DASH eating plan, you should limit salt (sodium) intake to 2,300 mg a day. If you have hypertension, you may need to reduce your sodium intake to 1,500 mg a day.  · Work with your health care  provider or dietitian to adjust your eating plan to your individual calorie needs.  This information is not intended to replace advice given to you by your health care provider. Make sure you discuss any questions you have with your health care provider.  Document Revised: 11/20/2020 Document Reviewed: 11/20/2020  [employer] Patient Education © 2021 [employer]      Exercising to Stay Healthy  To become healthy and stay healthy, it is recommended that you do moderate-intensity and vigorous-intensity exercise. You can tell that you are exercising at a moderate intensity if your heart starts beating faster and you start breathing faster but can still hold a conversation. You can tell that you are exercising at a vigorous intensity if you are breathing much harder and faster and cannot hold a conversation while exercising.  Exercising regularly is important. It has many health benefits, such as:  · Improving overall fitness, flexibility, and endurance.  · Increasing bone density.  · Helping with weight control.  · Decreasing body fat.  · Increasing muscle strength.  · Reducing stress and tension.  · Improving overall health.  How often should I exercise?  Choose an activity that you enjoy, and set realistic goals. Your health care provider can help you make an activity plan that works for you.  Exercise regularly as told by your health care provider. This may include:  · Doing strength training two times a week, such as:  ? Lifting weights.  ? Using resistance bands.  ? Push-ups.  ? Sit-ups.  ? Yoga.  · Doing a certain intensity of exercise for a given amount of time. Choose from these options:  ? A total of 150 minutes of moderate-intensity exercise every week.  ? A total of 75 minutes of vigorous-intensity exercise every week.  ? A mix of moderate-intensity and vigorous-intensity exercise every week.  Children, pregnant women, people who have not exercised regularly, people who are overweight, and older adults may  need to talk with a health care provider about what activities are safe to do. If you have a medical condition, be sure to talk with your health care provider before you start a new exercise program.  What are some exercise ideas?  Moderate-intensity exercise ideas include:  · Walking 1 mile (1.6 km) in about 15 minutes.  · Biking.  · Hiking.  · Golfing.  · Dancing.  · Water aerobics.  Vigorous-intensity exercise ideas include:  · Walking 4.5 miles (7.2 km) or more in about 1 hour.  · Jogging or running 5 miles (8 km) in about 1 hour.  · Biking 10 miles (16.1 km) or more in about 1 hour.  · Lap swimming.  · Roller-skating or in-line skating.  · Cross-country skiing.  · Vigorous competitive sports, such as football, basketball, and soccer.  · Jumping rope.  · Aerobic dancing.  What are some everyday activities that can help me to get exercise?  · Yard work, such as:  ? Pushing a .  ? Raking and bagging leaves.  · Washing your car.  · Pushing a stroller.  · Shoveling snow.  · Gardening.  · Washing windows or floors.  How can I be more active in my day-to-day activities?  · Use stairs instead of an elevator.  · Take a walk during your lunch break.  · If you drive, park your car farther away from your work or school.  · If you take public transportation, get off one stop early and walk the rest of the way.  · Stand up or walk around during all of your indoor phone calls.  · Get up, stretch, and walk around every 30 minutes throughout the day.  · Enjoy exercise with a friend. Support to continue exercising will help you keep a regular routine of activity.  What guidelines can I follow while exercising?  · Before you start a new exercise program, talk with your health care provider.  · Do not exercise so much that you hurt yourself, feel dizzy, or get very short of breath.  · Wear comfortable clothes and wear shoes with good support.  · Drink plenty of water while you exercise to prevent dehydration or heat  stroke.  · Work out until your breathing and your heartbeat get faster.  Where to find more information  · [place] Department of Health and Human Services: www.hhs.gov  · Centers for Disease Control and Prevention (CDC): www.cdc.gov  Summary  · Exercising regularly is important. It will improve your overall fitness, flexibility, and endurance.  · Regular exercise also will improve your overall health. It can help you control your weight, reduce stress, and improve your bone density.  · Do not exercise so much that you hurt yourself, feel dizzy, or get very short of breath.  · Before you start a new exercise program, talk with your health care provider.  This information is not intended to replace advice given to you by your health care provider. Make sure you discuss any questions you have with your health care provider.  Document Revised: 11/30/2018 Document Reviewed: 11/08/2018  [employer] Patient Education © 2021 [employer]       ADVANCE CARE PLANNING  Conversations that Matter  PLANNING GUIDE    LOOKING BACK ...  Who we are, what we believe, and what we value are all shaped by experiences we have had. [employer], family traditions, jobs and friends affect us deeply.    Has anything happened in your past that shaped your feelings about medical treatment?    Think about an experience you may have had with a family member or friend who was faced with a decision about medical care near the end of life. What was positive about that experience? What do you wish would have been done differently?    HERE AND NOW ...  Do you have any significant health problems now? What kinds of things bring you such curtis that, should a health problem prevent you from doing them any more, life would have little meaning? What short- or long-term goals do you have? How might medical treatment help you or hinder you in accomplishing those goals?    WHAT ABOUT TOMORROW?  What significant health problems do you fear may affect you in the future?  "How do you feel about the possibility of having to go to a nursing home? How would decisions be made if you could not make them?    WHO SHOULD MAKE DECISIONS?  An important part of planning is to consider whether or not you could appoint someone to make your healthcare decisions if you could not make them yourself. Many people select a close family member, but you are free to pick anyone you think could best represent you. Whoever you appoint should have all of the following qualifications:    • Can be trusted.  • Is willing to accept this responsibility.  • Is willing to follow the values and instructions you have discussed.  • Is able to make complex, difficult decisions.    It is helpful, but not required, to appoint one or more alternate persons in case your first choice becomes unable or unwilling to represent you. It is best if only one person has authority at a time, but you can instruct your representatives to discuss decisions together if time permits.    WHAT FUTURE DECISIONS NEED TO BE CONSIDERED?  Providing instructions for future healthcare decisions may seem like an impossible task. How can anyone plan for all the possibilities? You cannot … but you do not have to.    You need to plan for situations where you:  1. Become unexpectedly incapable of making your own decisions  2. It is clear you will have little or no recovery, and  3. The injury or loss of function is significant.    Such a situation might arise because of an injury to the brain from an accident, a stroke, or a slowly progressive disease such as Alzheimer's.    To plan for this type of situation, many people state, \"If I'm going to be a vegetable, let me go,\" or \"No heroics,\" or \"Don't keep me alive on machines.\" While these remarks are a beginning, they simply are too vague to guide decision-making.    You need to completely describe under what circumstances your goals for medical care should be changed from attempting to prolong life to " being allowed to die. In some situations, certain treatments may not make sense because they will not help, but other treatments will be of important benefit.    Consider these three questions:  1. When would it make sense to continue certain treatments in an effort to prolong life and seek recovery?  2. When would it make sense to stop or withhold certain treatments and accept death when it comes?  3. Under any circumstance, what kind of comfort care would you want, including medication, spiritual and environmental options?    Making these choices requires understanding the information, weighing the benefits and burdens from your perspective, and then discussing your choices with those closest to you.    WHAT'S NEXT?  How do you make sure that your choices are respected? First talk, about them with your family, friends, clergy and physician, then put your choices in writing. Information about putting your plans into writing -- in an advance directive -- is available from your healthcare organization or .    Do you have any significant health problems? What health problems do you fear in the future?     Consider what frightens you most about medical treatment. What role does Druze, safia or spirituality play in how you live your life? How does cost influence your decisions about medical care?   In terms of future medical care, under what circumstances would you want the goals of medical treatment to switch from attempting to prolong life to focusing on comfort?     Describe these circumstances in as much detail as possible.   Ask yourself, what will most help me live well at this point in my life?     Share your views with the person or people who would make your medical decisions if you could not make them.     THERE'S NO EASY WAY TO PLAN FOR FUTURE HEALTHCARE CHOICES.  It's a process that involves thinking and talking about complex and sensitive issues.    The questions that follow will help in the  advance care planning process. This is a guide for your own benefit; it's not a test, and there are no right or wrong answers. It does not need to be completed all at once. You may use it to share your feelings with healthcare providers, your family and your friends. The answers to these questions will help those you love make choices for you when you cannot make them yourself.    These are things I need to tell my loved ones:  What is your idea of comfort care? Describe how you would want medications to be used to provide comfort. What type of spiritual care would you want?     I need to learn about: ____________________________  I need to ask my healthcare provider: ________________

## 2021-11-12 NOTE — PROGRESS NOTES
Assessment and Plan     Problem List Items Addressed This Visit        Cardiac and Vasculature    Essential hypertension - Primary    Overview     BP not at goal, <140/90.  Encouraged low-Na+ diet & 150 min exercise/week.   Continue amlodipine 10 mg.  Start chlorthalidone.  Monitoring renal function.         Relevant Medications    chlorthalidone (HYGROTON) 25 MG tablet    Mixed hyperlipidemia    Overview     Reviewed lipid panel & 10-year ASCVD risk score.  Encouraged a diet rich in vegetables & 150 minutes of exercise weekly.  Could not tolerate atorvastatin.  Start rosuvastatin.         Relevant Medications    rosuvastatin (Crestor) 5 MG tablet       Endocrine and Metabolic    Class 1 obesity due to excess calories with serious comorbidity and body mass index (BMI) of 31.0 to 31.9 in adult    Overview     Discussed health risks associated with obesity.  Encouraged 150 minutes of exercise weekly.             Return in about 3 months (around 2/12/2022) for Recheck BP.  Patient's elevated BP was noted and discussed.  []   Patient will monitor BP and send numbers to me on [employer].  [x]   Patient will monitor BP and call in to office in two weeks.  []   Patient is under stress and we will monitor over time. No changes necessary at this time.  [x]  BP med changed.  []  BP on recheck was normal. No changes necessary.          Patient was given instructions and counseling regarding her condition or for health maintenance advice. Please see specific information pulled into the AVS if appropriate.        Maira is a 51 y.o. being seen today for  Hypertension (dicuss bp medications- stopped hydrochlorothiazide. heard there was recall on medication), Hyperlipidemia (stopped chol med- made her feel funny ), and Breathing Problem (needs note for work excusing her from wearing mask. can't breathe and gets lightheaded )   Subjective   History of the Present Illness     Obese black female with CMHx of HTN & HLD presents for follow  "up.    Hypertensive in office.  Discontinued her diuretic due to concerns of recall. HCTZ and irbesartan have been voluntarily recalled by .     Discontinued her statin due to complaints of dyspnea.  Reports the medication made her feel \"funny\", lightheadedness.    Exercise consists of walking with work. Eating white meat. Incorporating vegetables into diet.     Requesting work excuse so as to avoid wearing a mask.    Social History  She  reports that she has been smoking cigars. She has never used smokeless tobacco. She reports current alcohol use. She reports that she does not use drugs.  Objective   Vital Signs          Vitals:    11/12/21 1406   BP: (!) 175/103   BP Location: Left arm   Patient Position: Sitting   Cuff Size: Large Adult   Pulse: 76   Temp: 98 °F (36.7 °C)   TempSrc: Infrared   SpO2: 96%   Weight: 86.2 kg (190 lb)   Height: 160 cm (63\")     BP Readings from Last 1 Encounters:   11/12/21 (!) 175/103     Wt Readings from Last 3 Encounters:   11/12/21 86.2 kg (190 lb)   06/11/21 81.6 kg (180 lb)   03/25/21 78.5 kg (173 lb 1 oz)   Body mass index is 33.66 kg/m².     Physical Exam  Vitals reviewed.   Constitutional:       General: She is not in acute distress.     Appearance: She is well-developed. She is obese. She is not diaphoretic.   HENT:      Head: Normocephalic and atraumatic.   Cardiovascular:      Rate and Rhythm: Normal rate and regular rhythm.   Pulmonary:      Effort: Pulmonary effort is normal.      Breath sounds: Normal breath sounds.   Skin:     General: Skin is warm and dry.   Neurological:      Mental Status: She is alert and oriented to person, place, and time.   Psychiatric:         Mood and Affect: Mood normal.         Behavior: Behavior normal.               CBC Auto Differential (06/11/2021 11:30)  Comprehensive Metabolic Panel (06/11/2021 11:30)  Lipid Panel (06/11/2021 11:30)    "

## 2021-12-06 DIAGNOSIS — I10 ESSENTIAL HYPERTENSION: ICD-10-CM

## 2021-12-06 DIAGNOSIS — E78.2 MIXED HYPERLIPIDEMIA: ICD-10-CM

## 2021-12-06 RX ORDER — HYDROCHLOROTHIAZIDE 25 MG/1
TABLET ORAL
Qty: 90 TABLET | Refills: 1 | OUTPATIENT
Start: 2021-12-06

## 2021-12-06 RX ORDER — ATORVASTATIN CALCIUM 20 MG/1
TABLET, FILM COATED ORAL
Qty: 90 TABLET | Refills: 1 | OUTPATIENT
Start: 2021-12-06

## 2022-03-23 ENCOUNTER — OFFICE VISIT (OUTPATIENT)
Dept: FAMILY MEDICINE CLINIC | Facility: CLINIC | Age: 52
End: 2022-03-23

## 2022-03-23 ENCOUNTER — LAB (OUTPATIENT)
Dept: FAMILY MEDICINE CLINIC | Facility: CLINIC | Age: 52
End: 2022-03-23

## 2022-03-23 VITALS
HEART RATE: 78 BPM | HEIGHT: 63 IN | WEIGHT: 184 LBS | OXYGEN SATURATION: 99 % | DIASTOLIC BLOOD PRESSURE: 104 MMHG | SYSTOLIC BLOOD PRESSURE: 150 MMHG | TEMPERATURE: 98.6 F | BODY MASS INDEX: 32.6 KG/M2

## 2022-03-23 DIAGNOSIS — E78.2 MIXED HYPERLIPIDEMIA: ICD-10-CM

## 2022-03-23 DIAGNOSIS — E66.09 CLASS 1 OBESITY DUE TO EXCESS CALORIES WITH SERIOUS COMORBIDITY AND BODY MASS INDEX (BMI) OF 32.0 TO 32.9 IN ADULT: ICD-10-CM

## 2022-03-23 DIAGNOSIS — I10 ESSENTIAL HYPERTENSION: Primary | ICD-10-CM

## 2022-03-23 DIAGNOSIS — Z12.11 ENCOUNTER FOR SCREENING FOR MALIGNANT NEOPLASM OF COLON: ICD-10-CM

## 2022-03-23 LAB
ANION GAP SERPL CALCULATED.3IONS-SCNC: 10.5 MMOL/L (ref 5–15)
BUN SERPL-MCNC: 10 MG/DL (ref 6–20)
BUN/CREAT SERPL: 11.5 (ref 7–25)
CALCIUM SPEC-SCNC: 9.8 MG/DL (ref 8.6–10.5)
CHLORIDE SERPL-SCNC: 99 MMOL/L (ref 98–107)
CO2 SERPL-SCNC: 27.5 MMOL/L (ref 22–29)
CREAT SERPL-MCNC: 0.87 MG/DL (ref 0.57–1)
EGFRCR SERPLBLD CKD-EPI 2021: 81.8 ML/MIN/1.73
GLUCOSE SERPL-MCNC: 97 MG/DL (ref 65–99)
POTASSIUM SERPL-SCNC: 3.9 MMOL/L (ref 3.5–5.2)
SODIUM SERPL-SCNC: 137 MMOL/L (ref 136–145)

## 2022-03-23 PROCEDURE — 36415 COLL VENOUS BLD VENIPUNCTURE: CPT | Performed by: FAMILY MEDICINE

## 2022-03-23 PROCEDURE — 99213 OFFICE O/P EST LOW 20 MIN: CPT | Performed by: FAMILY MEDICINE

## 2022-03-23 PROCEDURE — 80048 BASIC METABOLIC PNL TOTAL CA: CPT | Performed by: FAMILY MEDICINE

## 2022-03-23 RX ORDER — CHLORTHALIDONE 50 MG/1
50 TABLET ORAL DAILY
Qty: 90 TABLET | Refills: 1 | Status: SHIPPED | OUTPATIENT
Start: 2022-03-23 | End: 2022-08-09 | Stop reason: SINTOL

## 2022-03-23 RX ORDER — HYDROCHLOROTHIAZIDE 25 MG/1
25 TABLET ORAL DAILY
COMMUNITY
End: 2022-03-23 | Stop reason: ALTCHOICE

## 2022-03-23 NOTE — PROGRESS NOTES
Assessment and Plan     Problem List Items Addressed This Visit        Cardiac and Vasculature    Essential hypertension - Primary    Overview     BP not at goal, <140/90.  Encouraged low-Na+ diet & 150 min exercise/week.   Continue amlodipine 10 mg.  Patient never started chlorthalidone as previously prescribed.  Started old prescription of hydrochlorothiazide.    DC HCTZ.  Start chlorthalidone. Increase to 50 mg.  Patient to monitor ambulatory BP.  Monitoring renal function.           Relevant Medications    chlorthalidone (HYGROTEN) 50 MG tablet    Other Relevant Orders    Basic metabolic panel    Mixed hyperlipidemia    Overview     Reviewed lipid panel, LDL goal, & 10-year ASCVD risk score.  Encouraged a diet rich in vegetables & 150 minutes of exercise weekly.  Could not tolerate atorvastatin.  Continue rosuvastatin nightly.           Relevant Orders    Basic metabolic panel       Endocrine and Metabolic    Class 1 obesity due to excess calories with serious comorbidity and body mass index (BMI) of 32.0 to 32.9 in adult    Overview     Discussed health risks associated with obesity.  Encouraged 150 minutes of exercise weekly.           Relevant Orders    Basic metabolic panel      Other Visit Diagnoses     Encounter for screening for malignant neoplasm of colon        Relevant Orders    Ambulatory Referral For Screening Colonoscopy (Completed)        Return in about 3 months (around 6/23/2022) for Annual Physical Exam, Pap Smear.        Patient was given instructions and counseling regarding her condition or for health maintenance advice. Please see specific information pulled into the AVS if appropriate.        Maira is a 51 y.o. being seen today for  Hypertension   Subjective   History of the Present Illness     Obese black female with a concurrent medical history of hypertension and hyperlipidemia presents for follow-up.    Hypertensive in office.  Reports normotensive ambulatory BP measured in the upper  "arm.  Patient was started on chlorthalidone in addition to her amlodipine her last visit.  Never started chlorthalidone.  Instead started on prescription of hydrochlorothiazide.  Plan to monitor electrolytes and kidney function.  Reports compliance with statin therapy.    Of note patient is due for an annual physical exam, Pap smear, and colonoscopy.    Answers for HPI/ROS submitted by the patient on 3/16/2022  What is the primary reason for your visit?: High Blood Pressure    Social History  She  reports that she quit smoking about 41 years ago. Her smoking use included cigarettes. She started smoking about 42 years ago. She smoked 0.00 packs per day for 1.00 year. She has never used smokeless tobacco. She reports previous alcohol use. She reports that she does not use drugs.  Objective   Vital Signs          Vitals:    03/23/22 0807   BP: (!) 150/104   BP Location: Right arm   Patient Position: Sitting   Cuff Size: Large Adult   Pulse: 78   Temp: 98.6 °F (37 °C)   TempSrc: Infrared   SpO2: 99%   Weight: 83.5 kg (184 lb)   Height: 160 cm (63\")     BP Readings from Last 1 Encounters:   03/23/22 (!) 150/104     Wt Readings from Last 3 Encounters:   03/23/22 83.5 kg (184 lb)   11/12/21 86.2 kg (190 lb)   06/11/21 81.6 kg (180 lb)   Body mass index is 32.59 kg/m².     Physical Exam  Vitals reviewed.   Constitutional:       General: She is not in acute distress.     Appearance: She is well-developed. She is obese. She is not diaphoretic.   HENT:      Head: Normocephalic and atraumatic.   Cardiovascular:      Rate and Rhythm: Normal rate and regular rhythm.   Pulmonary:      Effort: Pulmonary effort is normal.      Breath sounds: Normal breath sounds.   Skin:     General: Skin is warm and dry.   Neurological:      Mental Status: She is alert and oriented to person, place, and time.   Psychiatric:         Mood and Affect: Mood normal.         Behavior: Behavior normal.               Lipid Panel (06/11/2021 " 11:30)  Comprehensive Metabolic Panel (06/11/2021 11:30)  CBC Auto Differential (06/11/2021 11:30)

## 2022-08-09 ENCOUNTER — LAB (OUTPATIENT)
Dept: FAMILY MEDICINE CLINIC | Facility: CLINIC | Age: 52
End: 2022-08-09

## 2022-08-09 ENCOUNTER — OFFICE VISIT (OUTPATIENT)
Dept: FAMILY MEDICINE CLINIC | Facility: CLINIC | Age: 52
End: 2022-08-09

## 2022-08-09 VITALS
DIASTOLIC BLOOD PRESSURE: 112 MMHG | OXYGEN SATURATION: 97 % | SYSTOLIC BLOOD PRESSURE: 175 MMHG | HEART RATE: 79 BPM | TEMPERATURE: 97.9 F | HEIGHT: 63 IN | WEIGHT: 183 LBS | RESPIRATION RATE: 16 BRPM | BODY MASS INDEX: 32.43 KG/M2

## 2022-08-09 DIAGNOSIS — Z00.00 ANNUAL PHYSICAL EXAM: ICD-10-CM

## 2022-08-09 DIAGNOSIS — E66.09 CLASS 1 OBESITY DUE TO EXCESS CALORIES WITH SERIOUS COMORBIDITY AND BODY MASS INDEX (BMI) OF 32.0 TO 32.9 IN ADULT: ICD-10-CM

## 2022-08-09 DIAGNOSIS — Z12.4 ENCOUNTER FOR PAPANICOLAOU SMEAR FOR CERVICAL CANCER SCREENING: ICD-10-CM

## 2022-08-09 DIAGNOSIS — E78.2 MIXED HYPERLIPIDEMIA: ICD-10-CM

## 2022-08-09 DIAGNOSIS — Z12.31 ENCOUNTER FOR SCREENING MAMMOGRAM FOR MALIGNANT NEOPLASM OF BREAST: ICD-10-CM

## 2022-08-09 DIAGNOSIS — I10 ESSENTIAL HYPERTENSION: Primary | ICD-10-CM

## 2022-08-09 LAB
ALBUMIN SERPL-MCNC: 4.7 G/DL (ref 3.5–5.2)
ALBUMIN/GLOB SERPL: 1.4 G/DL
ALP SERPL-CCNC: 71 U/L (ref 39–117)
ALT SERPL W P-5'-P-CCNC: 15 U/L (ref 1–33)
ANION GAP SERPL CALCULATED.3IONS-SCNC: 14 MMOL/L (ref 5–15)
AST SERPL-CCNC: 19 U/L (ref 1–32)
BILIRUB SERPL-MCNC: 0.4 MG/DL (ref 0–1.2)
BUN SERPL-MCNC: 9 MG/DL (ref 6–20)
BUN/CREAT SERPL: 11.7 (ref 7–25)
CALCIUM SPEC-SCNC: 10.2 MG/DL (ref 8.6–10.5)
CHLORIDE SERPL-SCNC: 102 MMOL/L (ref 98–107)
CHOLEST SERPL-MCNC: 214 MG/DL (ref 0–200)
CO2 SERPL-SCNC: 28 MMOL/L (ref 22–29)
CREAT SERPL-MCNC: 0.77 MG/DL (ref 0.57–1)
EGFRCR SERPLBLD CKD-EPI 2021: 94.1 ML/MIN/1.73
GLOBULIN UR ELPH-MCNC: 3.4 GM/DL
GLUCOSE SERPL-MCNC: 88 MG/DL (ref 65–99)
HDLC SERPL-MCNC: 70 MG/DL (ref 40–60)
LDLC SERPL CALC-MCNC: 130 MG/DL (ref 0–100)
LDLC/HDLC SERPL: 1.82 {RATIO}
POTASSIUM SERPL-SCNC: 4.5 MMOL/L (ref 3.5–5.2)
PROT SERPL-MCNC: 8.1 G/DL (ref 6–8.5)
SODIUM SERPL-SCNC: 144 MMOL/L (ref 136–145)
TRIGL SERPL-MCNC: 82 MG/DL (ref 0–150)
VLDLC SERPL-MCNC: 14 MG/DL (ref 5–40)

## 2022-08-09 PROCEDURE — 36415 COLL VENOUS BLD VENIPUNCTURE: CPT | Performed by: FAMILY MEDICINE

## 2022-08-09 PROCEDURE — 80061 LIPID PANEL: CPT | Performed by: FAMILY MEDICINE

## 2022-08-09 PROCEDURE — 99396 PREV VISIT EST AGE 40-64: CPT | Performed by: FAMILY MEDICINE

## 2022-08-09 PROCEDURE — 80053 COMPREHEN METABOLIC PANEL: CPT | Performed by: FAMILY MEDICINE

## 2022-08-09 RX ORDER — AMLODIPINE BESYLATE 10 MG/1
10 TABLET ORAL DAILY
Qty: 90 TABLET | Refills: 1 | Status: SHIPPED | OUTPATIENT
Start: 2022-08-09

## 2022-08-09 NOTE — PROGRESS NOTES
Assessment and Plan     Problem List Items Addressed This Visit        Cardiac and Vasculature    Essential hypertension - Primary    Overview     BP not at goal, <140/90.  Encouraged low-Na+ diet & 150 min exercise/week.   Restart amlodipine 10 mg.  Could not tolerate high dose chlorthalidone due urinary frequency. Consider low dose diuretic for LE swelling.  Patient to monitor ambulatory BP.  Monitoring renal function.         Relevant Medications    amLODIPine (NORVASC) 10 MG tablet    Other Relevant Orders    Lipid panel    Comprehensive metabolic panel    Mixed hyperlipidemia    Overview     Reviewed lipid panel, LDL goal, & 10-year ASCVD risk score.  Encouraged a diet rich in vegetables & 150 minutes of exercise weekly.  Could not tolerate atorvastatin.  Continue rosuvastatin nightly.         Relevant Orders    Lipid panel    Comprehensive metabolic panel       Endocrine and Metabolic    Class 1 obesity due to excess calories with serious comorbidity and body mass index (BMI) of 32.0 to 32.9 in adult    Overview     Discussed health risks associated with obesity.  Encouraged 150 minutes of exercise weekly.         Relevant Orders    Lipid panel    Comprehensive metabolic panel      Other Visit Diagnoses     Encounter for Papanicolaou smear for cervical cancer screening        Relevant Orders    IGP,Aptima HPV,Age Gdln    Encounter for screening mammogram for malignant neoplasm of breast        Relevant Orders    Mammo Diagnostic Digital Tomosynthesis Bilateral With CAD    Annual physical exam        Relevant Orders    Lipid panel    IGP,Aptima HPV,Age Gdln    Comprehensive metabolic panel      Encouraged 150 minutes of moderate intensity activity weekly.   Encouraged regular dental visits.   Encouraged regular seat belt use.   Encouraged safe sex practices.   Patient provided with educational material regarding preventive health care in AVS.    Return in about 3 months (around 11/9/2022) for Recheck  BP.  Patient's elevated BP was noted and discussed.  []   Patient will monitor BP and send numbers to me on MyChart.  []   Patient will monitor BP and call in to office in two weeks.  []   Patient is under stress and we will monitor over time. No changes necessary at this time.  [x]  BP med changed.  []  BP on recheck was normal. No changes necessary.          Patient was given instructions and counseling regarding her condition or for health maintenance advice. Please see specific information pulled into the AVS if appropriate.        Maira is a 52 y.o. being seen today for  Annual Exam and Gynecologic Exam   Subjective   History of the Present Illness     Obese black female presents for an annual physical exam.    Diet: drinking boathouse, juice, water; frozen meals  Exercise: walking; closing Apple watch rings  Dentist: biannually for cleaning    Seatbelt Use: regular    Breast Cancer Screening: Not up to date. Testing ordered.   Cervical Cancer Screening: Not up to date. Testing ordered.   Colon Cancer Screening: Not up to date. Patient declined screening. Risks and benefits reviewed.     Hypertensive in office. BP well controlled with amlodipine 10 mg and chlorthalidone 50 mg. Discontinued antihypertensive therapy a week ago due to complaints of urinary frequency. Didn't know which medication was causing diuresis. Cannot recall LE swelling.    Social History  She  reports that she quit smoking about 41 years ago. Her smoking use included cigarettes. She started smoking about 42 years ago. She smoked 0.00 packs per day for 1.00 year. She has never used smokeless tobacco. She reports previous alcohol use. She reports that she does not use drugs.  Objective   Vital Signs          Vitals:    08/09/22 1510   BP: (!) 175/112   BP Location: Left arm   Patient Position: Sitting   Cuff Size: Adult   Pulse: 79   Resp: 16   Temp: 97.9 °F (36.6 °C)   TempSrc: Temporal   SpO2: 97%   Weight: 83 kg (183 lb)   Height: 160 cm  "(63\")     BP Readings from Last 1 Encounters:   08/09/22 (!) 175/112     Wt Readings from Last 3 Encounters:   08/09/22 83 kg (183 lb)   03/23/22 83.5 kg (184 lb)   11/12/21 86.2 kg (190 lb)   Body mass index is 32.42 kg/m².     Physical Exam  Vitals reviewed. Exam conducted with a chaperone present.   Constitutional:       General: She is not in acute distress.     Appearance: Normal appearance. She is obese.   HENT:      Head: Normocephalic and atraumatic.      Right Ear: Tympanic membrane and ear canal normal.      Left Ear: Tympanic membrane and ear canal normal.      Mouth/Throat:      Mouth: Mucous membranes are moist.      Pharynx: Oropharynx is clear.   Eyes:      Extraocular Movements: Extraocular movements intact.      Pupils: Pupils are equal, round, and reactive to light.   Cardiovascular:      Rate and Rhythm: Normal rate.      Heart sounds: Normal heart sounds.   Pulmonary:      Effort: Pulmonary effort is normal.      Breath sounds: Normal breath sounds.   Genitourinary:     Exam position: Supine.      Labia:         Right: No rash.         Left: No rash.       Vagina: Normal.      Cervix: Normal.      Uterus: Normal. Not tender.       Adnexa:         Right: No tenderness.          Left: No tenderness.     Neurological:      Mental Status: She is alert and oriented to person, place, and time.   Psychiatric:         Mood and Affect: Mood normal.         Behavior: Behavior normal.               CBC Auto Differential (06/11/2021 11:30)  Comprehensive Metabolic Panel (06/11/2021 11:30)  Lipid Panel (06/11/2021 11:30)  Basic metabolic panel (03/23/2022 08:41)    The 10-year ASCVD risk score (C[place] SERGIO Jr., et al., 2013) is: 8.5%    Values used to calculate the score:      Age: 52 years      Sex: Female      Is Non- : Yes      Diabetic: No      Tobacco smoker: No      Systolic Blood Pressure: 175 mmHg      Is BP treated: Yes      HDL Cholesterol: 72 mg/dL      Total Cholesterol: 204 " mg/dL

## 2022-08-11 LAB
AGE GDLN ACOG TESTING: NORMAL
CYTOLOGIST CVX/VAG CYTO: NORMAL
CYTOLOGY CVX/VAG DOC CYTO: NORMAL
CYTOLOGY CVX/VAG DOC THIN PREP: NORMAL
DX ICD CODE: NORMAL
HIV 1 & 2 AB SER-IMP: NORMAL
HPV I/H RISK 4 DNA CVX QL PROBE+SIG AMP: NEGATIVE
OTHER STN SPEC: NORMAL
STAT OF ADQ CVX/VAG CYTO-IMP: NORMAL

## 2022-08-15 DIAGNOSIS — E78.2 MIXED HYPERLIPIDEMIA: ICD-10-CM

## 2022-08-15 RX ORDER — ROSUVASTATIN CALCIUM 10 MG/1
10 TABLET, COATED ORAL NIGHTLY
Qty: 90 TABLET | Refills: 1 | Status: SHIPPED | OUTPATIENT
Start: 2022-08-15 | End: 2022-08-23

## 2022-08-22 ENCOUNTER — HOSPITAL ENCOUNTER (OUTPATIENT)
Dept: MAMMOGRAPHY | Facility: HOSPITAL | Age: 52
Discharge: HOME | End: 2022-08-22
Admitting: FAMILY MEDICINE

## 2022-08-22 DIAGNOSIS — Z12.31 ENCOUNTER FOR SCREENING MAMMOGRAM FOR MALIGNANT NEOPLASM OF BREAST: ICD-10-CM

## 2022-08-22 PROCEDURE — G0279 TOMOSYNTHESIS, MAMMO: HCPCS

## 2022-08-22 PROCEDURE — 77066 DX MAMMO INCL CAD BI: CPT

## 2022-08-23 ENCOUNTER — TELEPHONE (OUTPATIENT)
Dept: FAMILY MEDICINE CLINIC | Facility: CLINIC | Age: 52
End: 2022-08-23

## 2022-08-23 DIAGNOSIS — E78.2 MIXED HYPERLIPIDEMIA: Primary | ICD-10-CM

## 2022-08-23 RX ORDER — ATORVASTATIN CALCIUM 20 MG/1
20 TABLET, FILM COATED ORAL NIGHTLY
Qty: 90 TABLET | Refills: 1 | Status: SHIPPED | OUTPATIENT
Start: 2022-08-23

## 2022-08-23 NOTE — TELEPHONE ENCOUNTER
Caller: Maira Benson    Relationship: Self    Best call back number: [phone] (H)      PLEASE GIVE PATIENT A CALLBACK REGARDING CHOLESTEROL MEDICATION. DR BENNETT WAS CALLING HER IN A NEW MEDICATION THAT INSURANCE WOULD NOT COVER AND SHE WAS FOLLOWING UP ON AN ALTERNATIVE MEDICATION .    [facility]pharm[facility] #[id number] - [place], [place] - [address] [phone] [facility] [phone] [phone]

## 2022-08-24 ENCOUNTER — TELEPHONE (OUTPATIENT)
Dept: FAMILY MEDICINE CLINIC | Facility: CLINIC | Age: 52
End: 2022-08-24

## 2022-08-24 NOTE — TELEPHONE ENCOUNTER
Pt has been given mammogram results. I am mailing her a copy as well. Her address is for [place]. I have verified address with her. Address in chart is [address] [place] [address]. Chart did not have street [address] so I have updated chart and mailed results.

## 2022-09-01 ENCOUNTER — HOSPITAL ENCOUNTER (EMERGENCY)
Facility: HOSPITAL | Age: 52
Discharge: HOME | End: 2022-09-01
Attending: EMERGENCY MEDICINE | Admitting: EMERGENCY MEDICINE

## 2022-09-01 VITALS
OXYGEN SATURATION: 99 % | DIASTOLIC BLOOD PRESSURE: 101 MMHG | SYSTOLIC BLOOD PRESSURE: 172 MMHG | HEART RATE: 70 BPM | RESPIRATION RATE: 16 BRPM | TEMPERATURE: 97.9 F

## 2022-09-01 DIAGNOSIS — B02.9 HERPES ZOSTER WITHOUT COMPLICATION: Primary | ICD-10-CM

## 2022-09-01 DIAGNOSIS — R21 RASH: ICD-10-CM

## 2022-09-01 PROCEDURE — 99282 EMERGENCY DEPT VISIT SF MDM: CPT

## 2022-09-01 RX ORDER — VALACYCLOVIR HYDROCHLORIDE 1 G/1
1000 TABLET, FILM COATED ORAL 3 TIMES DAILY
Qty: 30 TABLET | Refills: 0 | Status: SHIPPED | OUTPATIENT
Start: 2022-09-01 | End: 2022-09-08 | Stop reason: SDUPTHER

## 2022-09-01 RX ORDER — HYDROCODONE BITARTRATE AND ACETAMINOPHEN 5; 325 MG/1; MG/1
1 TABLET ORAL EVERY 6 HOURS PRN
Qty: 10 TABLET | Refills: 0 | Status: SHIPPED | OUTPATIENT
Start: 2022-09-01 | End: 2022-09-08 | Stop reason: SDUPTHER

## 2022-09-01 NOTE — ED PROVIDER NOTES
EMERGENCY DEPARTMENT ENCOUNTER    Room Number:  A01/01  Date seen:  9/1/2022  Time seen: 18:02 EDT  PCP: Michelle Marcos APRN  Historian: pt    HPI:  Chief complaint:abdominal rash  A complete HPI/ROS/PMH/PSH/SH/FH are unobtainable due to: n/a  Context:Maira Benson is a 52 y.o. female who presents to the ED with c/o rash that is circular on her left abdomen that has been present since Friday.  It is described as burning and painful and the pain (not the rash) radiates around to her back.  It is not made better by aveeno oatmeal bath, hydrocortisone ointment or aloe vera gel.  She has not had this problem before nor has she been around anyone with similar rashes or been in woods. Denies any type of insect bites    Patient was placed in face mask in first look. Patient was wearing facemask when I entered the room and throughout our encounter. I wore full protective equipment throughout this patient encounter including a N95 face mask, eye shield and gloves. Hand hygiene/washing of hands was performed before donning protective equipment and after removal when leaving the room.    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   • Class 1 obesity due to excess calories with serious comorbidity and body mass index (BMI) of 32.0 to 32.9 in adult 09/19/2020   • Radial styloid tenosynovitis of right hand 06/11/2021   • Trochanteric bursitis, left hip 04/09/2018   • Mixed hyperlipidemia 11/12/2021     Resolved Ambulatory Problems     Diagnosis Date Noted   • No Resolved Ambulatory Problems     Past Medical History:   Diagnosis Date   • Anemia 1989   • Glaucoma 2000   • Hypertension    • Irritable bowel syndrome    • Seizures (HCC) 2003       PAST  SURGICAL HISTORY  Past Surgical History:   Procedure Laterality Date   • ANKLE SURGERY     • CARPAL TUNNEL RELEASE     • EYE SURGERY     • GLAUCOMA SURGERY     • TUBAL ABDOMINAL LIGATION         FAMILY HISTORY  Family History   Problem Relation Age of Onset   • Cancer Mother    • Diabetes Father    • Cancer Maternal Aunt    • Cancer Maternal Grandmother    • Cancer Maternal Grandfather    • Diabetes Paternal Grandmother        SOCIAL HISTORY  Social History     Socioeconomic History   • Marital status:    Tobacco Use   • Smoking status: Former Smoker     Packs/day: 0.00     Years: 1.00     Pack years: 0.00     Types: Cigarettes     Start date: 1/3/1980     Quit date: 1981     Years since quittin.6   • Smokeless tobacco: Never Used   • Tobacco comment: rare   Vaping Use   • Vaping Use: Never used   Substance and Sexual Activity   • Alcohol use: Not Currently     Comment: rare   • Drug use: Never   • Sexual activity: Not Currently     Partners: Male     Birth control/protection: Surgical       REVIEW OF SYSTEMS  Review of Systems    All systems reviewed and negative except for those discussed in HPI.     PHYSICAL EXAM    ED Triage Vitals [22 1744]   Temp Heart Rate Resp BP SpO2   97.9 °F (36.6 °C) 71 16 -- 96 %      Temp src Heart Rate Source Patient Position BP Location FiO2 (%)   Tympanic Monitor -- -- --     Physical Exam  Abdominal:          Comments: Area of rash.  It is not necessarily vesicular but there are several smaller areas of pustules.  She does not have rash to the back but the pain radiates.              I have reviewed the triage vital signs and nursing notes.      GENERAL: not distressed, pleasant and cooperative  HENT: nares patent  EYES: no scleral icterus  NECK: no ROM limitations  CV: regular rhythm, regular rate, no murmur  RESPIRATORY: normal effort, CTAB  ABDOMEN: soft, see diagram  : deferred  MUSCULOSKELETAL: no deformity  NEURO: alert, moves all extremities,  follows commands  SKIN: warm, dry      PROGRESS, DATA ANALYSIS, CONSULTS AND MEDICAL DECISION MAKING  All labs have been independently reviewed by me.  All radiology studies have been reviewed by me and discussed with radiologist dictating the report.  EKG's independently viewed and interpreted by me unless stated otherwise. Discussion below represents my analysis of pertinent findings related to patient's condition, differential diagnosis, treatment plan and final disposition.     ED Course as of 09/01/22 1828   Thu Sep 01, 2022   1806 The actual area of rash is quite small but the pain with rash does seem to affect the dermatome which does concern me for a case of shingles.  Rash started Friday so it is unlikely that Valtrex will help; I will still order this.  Will have patient take Tylenol/Ibuprofen as needed for pain.  She will follow up with Primary Care early next week.  RTER precautions given to patient. She does not appear toxic.  [EW]   1828  DDX to be considered include: scabies, ring worm, insect bites, zoster   [EW]      ED Course User Index  [EW] Ivet Beaulieu APRN        Reviewed pt's history and workup with Dr. Christiansen    The patient's history, physical exam, and lab findings were discussed with the physician.  I discussed all results and noted any abnormalities with patient.  Discussed absoute need to recheck abnormalities with their family physician.  I answered any of the patient's questions.  Discussed plan for discharge, as there is no emergent indication for admission.  Pt is agreeable and understands need for follow up and repeat testing.  Pt is aware that discharge does not mean that nothing is wrong but it indicates no emergency is present and they must continue care with their family physician.  Pt is discharged with instructions to follow up with primary care doctor to have their blood pressure rechecked.       Disposition vitals:  BP (!) 172/101 (BP Location: Left arm, Patient  Position: Sitting) Comment: Pt reports not having taken daily prescribed HTN meds today  Pulse 70   Temp 97.9 °F (36.6 °C) (Tympanic)   Resp 16   SpO2 99%       DIAGNOSIS  Final diagnoses:   Herpes zoster without complication   Rash, left abdomen       FOLLOW UP   Michelle Marcos, APRN  [address]  [place] [address] [phone]    Schedule an appointment as soon as possible for a visit            Ivet Beaulieu APRN  09/01/22 2712

## 2022-09-01 NOTE — DISCHARGE INSTRUCTIONS
Although you are being discharged from the ED today, I encourage you to return for worsening symptoms. Things can, and do, change such that treatment at home with medication may not be adequate. Specifically I recommend returning for chest pain or discomfort, difficulty breathing, persistent vomiting or difficulty holding down liquids or medications, fever > 102.0 F,  or any other worsening or alarming symptoms.     Rest. Drink plenty of fluids.  Follow up with PCP or provider listed for further evaluation and management.  Follow up with primary care provider for further management and to have blood pressure rechecked.  Take all medications as prescribed.    Note Disclaimer: At [facility], we believe that sharing information builds trust and better relationships. You are receiving this note because you recently visited [facility]. It is possible you will see health information before a provider has talked with you about it. This kind of information can be easy to misunderstand. To help you fully understand what it means for your health, we urge you to discuss this note with your provider.

## 2022-09-01 NOTE — ED TRIAGE NOTES
19G core x 5. Rt lobe. Comp-none. Pt states that she has a rash to her lower left abdomen with pain and burning. States that the pain wraps around her abdomen into her back.    Patient masked at arrival and triage staff wore all appropriate PPE during entire encounter with patient.

## 2022-09-08 ENCOUNTER — OFFICE VISIT (OUTPATIENT)
Dept: FAMILY MEDICINE CLINIC | Facility: CLINIC | Age: 52
End: 2022-09-08

## 2022-09-08 VITALS
TEMPERATURE: 98.3 F | RESPIRATION RATE: 16 BRPM | OXYGEN SATURATION: 97 % | WEIGHT: 186 LBS | SYSTOLIC BLOOD PRESSURE: 153 MMHG | DIASTOLIC BLOOD PRESSURE: 93 MMHG | HEART RATE: 89 BPM | HEIGHT: 63 IN | BODY MASS INDEX: 32.96 KG/M2

## 2022-09-08 DIAGNOSIS — B02.9 HERPES ZOSTER WITHOUT COMPLICATION: Primary | ICD-10-CM

## 2022-09-08 PROCEDURE — 99213 OFFICE O/P EST LOW 20 MIN: CPT | Performed by: PHYSICIAN ASSISTANT

## 2022-09-08 RX ORDER — VALACYCLOVIR HYDROCHLORIDE 1 G/1
1000 TABLET, FILM COATED ORAL 3 TIMES DAILY
Qty: 30 TABLET | Refills: 0 | Status: SHIPPED | OUTPATIENT
Start: 2022-09-08 | End: 2022-09-18

## 2022-09-08 RX ORDER — HYDROCODONE BITARTRATE AND ACETAMINOPHEN 5; 325 MG/1; MG/1
1 TABLET ORAL EVERY 6 HOURS PRN
Qty: 10 TABLET | Refills: 0 | Status: SHIPPED | OUTPATIENT
Start: 2022-09-08

## 2022-09-08 NOTE — PROGRESS NOTES
Subjective   Maira Benson is a 52 y.o. female.     Pt presents to follow up from urgent care visit where she was diagnosed with shingles. She was unable to get medications filled since she has [place] medicaid and they were written by a [place] provider.  She is still having some pain and rash is still present on abdomen.  She denies any fevers.  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       The following portions of the patient's history were reviewed and updated as appropriate: allergies, current medications, past family history, past medical history, past social history, past surgical history and problem list.  Past Medical History:   Diagnosis Date   • Anemia    • Glaucoma    • Hypertension     Don’t remember   • Irritable bowel syndrome     Don’t remember   • Seizures (HCC)      Past Surgical History:   Procedure Laterality Date   • ANKLE SURGERY     • CARPAL TUNNEL RELEASE     • EYE SURGERY     • GLAUCOMA SURGERY     • TUBAL ABDOMINAL LIGATION       Family History   Problem Relation Age of Onset   • Cancer Mother    • Diabetes Father    • Cancer Maternal Aunt    • Cancer Maternal Grandmother    • Cancer Maternal Grandfather    • Diabetes Paternal Grandmother      Social History     Socioeconomic History   • Marital status:    Tobacco Use   • Smoking status: Former Smoker     Packs/day: 0.00     Years: 1.00     Pack years: 0.00     Types: Cigarettes     Start date: 1/3/1980     Quit date: 1981     Years since quittin.6   • Smokeless tobacco: Never Used   • Tobacco comment: rare   Vaping Use   • Vaping Use: Never used   Substance and Sexual Activity   • Alcohol use: Not Currently     Comment: rare   • Drug use: Never   • Sexual activity: Not Currently     Partners: Male     Birth control/protection: Surgical         Current Outpatient Medications:   •  amLODIPine (NORVASC) 10 MG tablet, Take 1 tablet by mouth Daily., Disp: 90 tablet, Rfl:  "1  •  atorvastatin (LIPITOR) 20 MG tablet, Take 1 tablet by mouth Every Night., Disp: 90 tablet, Rfl: 1  •  HYDROcodone-acetaminophen (NORCO) 5-325 MG per tablet, Take 1 tablet by mouth Every 6 (Six) Hours As Needed for Moderate Pain., Disp: 10 tablet, Rfl: 0  •  valACYclovir (VALTREX) 1000 MG tablet, Take 1 tablet by mouth 3 (Three) Times a Day for 10 days., Disp: 30 tablet, Rfl: 0    Review of Systems   Constitutional: Positive for fatigue. Negative for activity change, appetite change, chills, diaphoresis, fever, unexpected weight gain and unexpected weight loss.   Respiratory: Negative for chest tightness and shortness of breath.    Cardiovascular: Negative for chest pain, palpitations and leg swelling.   Gastrointestinal: Positive for abdominal pain. Negative for constipation, diarrhea, nausea and vomiting.   Skin: Positive for rash.   Neurological: Negative for weakness, light-headedness and headache.   Psychiatric/Behavioral: Negative for sleep disturbance and stress.     /93 (BP Location: Left arm, Patient Position: Sitting, Cuff Size: Large Adult)   Pulse 89   Temp 98.3 °F (36.8 °C) (Temporal)   Resp 16   Ht 160 cm (63\")   Wt 84.4 kg (186 lb)   SpO2 97%   BMI 32.95 kg/m²       Objective   Physical Exam  Vitals and nursing note reviewed.   Constitutional:       Appearance: Normal appearance.   Cardiovascular:      Rate and Rhythm: Normal rate and regular rhythm.      Heart sounds: Normal heart sounds.   Pulmonary:      Effort: Pulmonary effort is normal.      Breath sounds: Normal breath sounds.   Abdominal:      General: Abdomen is flat. Bowel sounds are normal.      Palpations: Abdomen is soft.      Tenderness: There is abdominal tenderness.       Musculoskeletal:      Cervical back: Normal range of motion and neck supple.   Skin:     General: Skin is warm and dry.      Findings: Rash present.   Neurological:      General: No focal deficit present.      Mental Status: She is alert and oriented " to person, place, and time.   Psychiatric:         Mood and Affect: Mood normal.         Behavior: Behavior normal.         Thought Content: Thought content normal.         Procedures       Diagnoses and all orders for this visit:    1. Herpes zoster without complication (Primary)  Comments:  Pt to take medications as directed.  Work note given and she will let me know if any issues.  Orders:  -     valACYclovir (VALTREX) 1000 MG tablet; Take 1 tablet by mouth 3 (Three) Times a Day for 10 days.  Dispense: 30 tablet; Refill: 0  -     HYDROcodone-acetaminophen (NORCO) 5-325 MG per tablet; Take 1 tablet by mouth Every 6 (Six) Hours As Needed for Moderate Pain.  Dispense: 10 tablet; Refill: 0

## 2022-09-16 DIAGNOSIS — I10 ESSENTIAL HYPERTENSION: ICD-10-CM

## 2022-09-16 RX ORDER — CHLORTHALIDONE 50 MG/1
TABLET ORAL
Qty: 90 TABLET | Refills: 1 | OUTPATIENT
Start: 2022-09-16

## 2022-10-25 ENCOUNTER — HOSPITAL ENCOUNTER (EMERGENCY)
Facility: HOSPITAL | Age: 52
Discharge: HOME | End: 2022-10-25
Attending: EMERGENCY MEDICINE | Admitting: EMERGENCY MEDICINE

## 2022-10-25 ENCOUNTER — APPOINTMENT (OUTPATIENT)
Dept: CT IMAGING | Facility: HOSPITAL | Age: 52
End: 2022-10-25

## 2022-10-25 ENCOUNTER — APPOINTMENT (OUTPATIENT)
Dept: GENERAL RADIOLOGY | Facility: HOSPITAL | Age: 52
End: 2022-10-25

## 2022-10-25 VITALS
TEMPERATURE: 98.5 F | RESPIRATION RATE: 18 BRPM | DIASTOLIC BLOOD PRESSURE: 78 MMHG | HEART RATE: 95 BPM | SYSTOLIC BLOOD PRESSURE: 155 MMHG | OXYGEN SATURATION: 98 %
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
## 2022-10-25 DIAGNOSIS — S16.1XXA CERVICAL MUSCLE STRAIN, INITIAL ENCOUNTER: Primary | ICD-10-CM

## 2022-10-25 PROCEDURE — 99282 EMERGENCY DEPT VISIT SF MDM: CPT

## 2022-10-25 PROCEDURE — 72125 CT NECK SPINE W/O DYE: CPT

## 2022-10-25 RX ORDER — CYCLOBENZAPRINE HCL 5 MG
TABLET ORAL
Qty: 30 TABLET | Refills: 0 | Status: SHIPPED | OUTPATIENT
Start: 2022-10-25
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
## 2023-02-10 ENCOUNTER — HOSPITAL ENCOUNTER (EMERGENCY)
Facility: HOSPITAL | Age: 53
Discharge: HOME | End: 2023-02-10
Attending: EMERGENCY MEDICINE | Admitting: EMERGENCY MEDICINE
Payer: COMMERCIAL

## 2023-02-10 ENCOUNTER — APPOINTMENT (OUTPATIENT)
Dept: GENERAL RADIOLOGY | Facility: HOSPITAL | Age: 53
End: 2023-02-10
Payer: COMMERCIAL

## 2023-02-10 VITALS
SYSTOLIC BLOOD PRESSURE: 201 MMHG | HEART RATE: 76 BPM | TEMPERATURE: 98.7 F | RESPIRATION RATE: 18 BRPM | OXYGEN SATURATION: 98 % | BODY MASS INDEX: 32.78 KG/M2 | DIASTOLIC BLOOD PRESSURE: 113 MMHG | WEIGHT: 185 LBS | HEIGHT: 63 IN

## 2023-02-10 DIAGNOSIS — L72.9 SUBCUTANEOUS CYST: Primary | ICD-10-CM

## 2023-02-10 DIAGNOSIS — M25.532 ACUTE PAIN OF LEFT WRIST: ICD-10-CM

## 2023-02-10 PROCEDURE — 73110 X-RAY EXAM OF WRIST: CPT

## 2023-02-10 PROCEDURE — 99283 EMERGENCY DEPT VISIT LOW MDM: CPT

## 2023-02-10 RX ORDER — IBUPROFEN 800 MG/1
800 TABLET ORAL ONCE
Status: COMPLETED | OUTPATIENT
Start: 2023-02-10 | End: 2023-02-10

## 2023-02-10 RX ORDER — IBUPROFEN 600 MG/1
600 TABLET ORAL EVERY 6 HOURS PRN
Qty: 15 TABLET | Refills: 0 | Status: SHIPPED | OUTPATIENT
Start: 2023-02-10

## 2023-02-10 RX ORDER — IBUPROFEN 600 MG/1
600 TABLET ORAL EVERY 6 HOURS PRN
Qty: 15 TABLET | Refills: 0 | Status: SHIPPED | OUTPATIENT
Start: 2023-02-10 | End: 2023-02-10 | Stop reason: SDUPTHER

## 2023-02-10 RX ADMIN — IBUPROFEN 800 MG: 800 TABLET, FILM COATED ORAL at 14:25

## 2023-02-10 NOTE — ED TRIAGE NOTES
Patient to ER via car from home for L wrist pain with no known injury    Patient wearing mask this RN in PPE

## 2023-02-10 NOTE — ED PROVIDER NOTES
EMERGENCY DEPARTMENT ENCOUNTER    Room Number:  S03/03  Date of encounter:  2/10/2023  PCP: Provider, No Known  Historian: Patient  Full history not obtainable due to: None    HPI:  Chief Complaint: Wrist pain    Context: Maira Benson is a 52 y.o. female with a PMH significant for radial styloid tenosynovitis of the right hand who presents to the ED c/o atraumatic left wrist pain since yesterday.  The patient started feeling some discomfort and swelling to the radial aspect of the left wrist that progressed through the night.  She has less swelling today but her pain is persistent so she comes for further evaluation.  Denies prior issues similar to this in the past.  Tried to apply ice with very little relief of symptoms.      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   • Class 1 obesity due to excess calories with serious comorbidity and body mass index (BMI) of 32.0 to 32.9 in adult 09/19/2020   • Radial styloid tenosynovitis of right hand 06/11/2021   • Trochanteric bursitis, left hip 04/09/2018   • Mixed hyperlipidemia 11/12/2021     Resolved Ambulatory Problems     Diagnosis Date Noted   • No Resolved Ambulatory Problems     Past Medical History:   Diagnosis Date   • Anemia 1989   • Glaucoma 2000   • Hypertension    • Irritable bowel syndrome    • Seizures (HCC) 2003         PAST SURGICAL HISTORY  Past Surgical History:   Procedure Laterality Date   • ANKLE SURGERY     • CARPAL TUNNEL RELEASE     • EYE SURGERY  2013   • GLAUCOMA SURGERY     • TUBAL ABDOMINAL LIGATION           FAMILY HISTORY  Family  History   Problem Relation Age of Onset   • Cancer Mother    • Diabetes Father    • Cancer Maternal Aunt    • Cancer Maternal Grandmother    • Cancer Maternal Grandfather    • Diabetes Paternal Grandmother          SOCIAL HISTORY  Social History     Socioeconomic History   • Marital status: Single   Tobacco Use   • Smoking status: Former     Packs/day: 0.00     Years: 1.00     Pack years: 0.00     Types: Cigarettes     Start date: 1/3/1980     Quit date: 1981     Years since quittin.0   • Smokeless tobacco: Never   • Tobacco comments:     rare   Vaping Use   • Vaping Use: Never used   Substance and Sexual Activity   • Alcohol use: Not Currently     Comment: rare   • Drug use: Never   • Sexual activity: Not Currently     Partners: Male     Birth control/protection: Surgical         ALLERGIES  Codeine, Lisinopril, and Compazine [prochlorperazine]        REVIEW OF SYSTEMS    All systems reviewed and marked as negative except as listed in HPI     PHYSICAL EXAM    I have reviewed the triage vital signs and nursing notes.    ED Triage Vitals [02/10/23 1351]   Temp Heart Rate Resp BP SpO2   98.7 °F (37.1 °C) 76 18 -- 98 %      Temp src Heart Rate Source Patient Position BP Location FiO2 (%)   -- -- -- -- --       Physical Exam  Constitutional:       General: She is not in acute distress.     Appearance: She is well-developed.   HENT:      Head: Normocephalic and atraumatic.   Eyes:      General: No scleral icterus.     Conjunctiva/sclera: Conjunctivae normal.   Neck:      Trachea: No tracheal deviation.   Cardiovascular:      Rate and Rhythm: Normal rate and regular rhythm.   Pulmonary:      Effort: Pulmonary effort is normal.      Breath sounds: Normal breath sounds.   Abdominal:      Palpations: Abdomen is soft.      Tenderness: There is no abdominal tenderness.   Musculoskeletal:         General: No deformity.      Left wrist: Tenderness (Overlying the radial aspect of the wrist) present. Decreased range of  motion.      Cervical back: Normal range of motion.      Comments: 1 cm mobile cystic structure overlying the left wrist, radial aspect.  Tender to palpation.  No overlying erythema or swelling.   Lymphadenopathy:      Cervical: No cervical adenopathy.   Skin:     General: Skin is warm and dry.   Neurological:      Mental Status: She is alert and oriented to person, place, and time.   Psychiatric:         Behavior: Behavior normal.         Vital signs and nursing notes reviewed.            LAB RESULTS  No results found for this or any previous visit (from the past 24 hour(s)).    Ordered the above labs and independently reviewed the results.        RADIOLOGY  XR Wrist 3+ View Left    Result Date: 2/10/2023  XR WRIST 3+ VW LEFT-  INDICATIONS: Pain  TECHNIQUE: 4 views of the left wrist  COMPARISON: None available  FINDINGS:  As positioned, ulnar positive variation is apparent. No acute fracture, erosion, or dislocation is identified. If further imaging evaluation is indicated, MRI could be considered.       As described.    This report was finalized on 2/10/2023 2:16 PM by Dr. Edmundo García M.D.        I ordered the above noted radiological studies. Independently reviewed by me and discussed with radiologist.  See dictation above for official radiology interpretation.      PROCEDURES    Procedures        MEDICATIONS GIVEN IN ER    Medications   ibuprofen (ADVIL,MOTRIN) tablet 800 mg (800 mg Oral Given 2/10/23 1425)         PROGRESS, DATA ANALYSIS, CONSULTS, AND MEDICAL DECISION MAKING    All labs have been independently interpreted by me.  All radiology studies have been interpreted by me.  Discussion below represents my analysis of pertinent findings related to patient's condition, differential diagnosis, treatment plan and final disposition.    Patient presentation consistent with subcutaneous cyst to the radial aspect of the wrist.  No known injury.  Point tenderness over the affected area.  No evidence of  infection or inflammation.  Plan to treat conservatively with hand specialty follow-up.    - Chronic or social conditions impacting care: None      DIFFERENTIAL DIAGNOSIS INCLUDE BUT NOT LIMITED TO:     Wrist fracture, wrist pain, wrist cyst, cellulitis      Orders placed during this visit:  Orders Placed This Encounter   Procedures   • XR Wrist 3+ View Left   • Apply ace wrap              AS OF 14:35 EST VITALS:    BP - (!) 201/113  HR - 76  TEMP - 98.7 °F (37.1 °C)  02 SATS - 98%    1435 I rechecked the patient.  I discussed the patient's radiology findings (including all incidental findings), diagnosis, and plan for discharge.  A repeat exam reveals no new worrisome changes from my initial exam findings.  The patient understands that the fact that they are being discharged does not denote that nothing is abnormal, it indicates that no clinical emergency is present and that they must follow-up as directed in order to properly maintain their health.  Follow-up instructions (specifically listed below) and return to ER precautions were given at this time.  I specifically instructed the patient to follow-up with their PCP.  The patient understands and agrees with the plan, and is ready for d/c.  All questions answered.        DIAGNOSIS  Final diagnoses:   Subcutaneous cyst   Acute pain of left wrist         DISPOSITION  D/c    Pt masked in first look. I wore a surgical mask throughout my encounters with the pt. I performed hand hygiene on entry into the pt room and upon exit.     Dictated utilizing Dragon dictation     Note Disclaimer: At [facility], we believe that sharing information builds trust and better relationships. You are receiving this note because you recently visited [facility]. It is possible you will see health information before a provider has talked with you about it. This kind of information can be easy to misunderstand. To help you fully understand what it means for your health, we urge you  to discuss this note with your provider.      Darvin Rosenbaum PA  02/10/23 7508

## 2023-02-10 NOTE — ED PROVIDER NOTES
MD ATTESTATION NOTE    The VIVI and I have discussed this patient's history, physical exam, and treatment plan.  I have reviewed the documentation and personally had a face to face interaction with the patient. I affirm the documentation and agree with the treatment and plan.  The attached note describes my personal findings.      I provided a substantive portion of the care of the patient.  I personally performed the physical exam in its entirety, and below are my findings.  For this patient encounter, the patient wore surgical mask, I wore full protective PPE including N95 and eye protection    Brief HPI: 52-year-old female who presents to the ER complaining of left wrist pain since yesterday.  Patient states she had a swelling last night that is improved today.  The patient denies prior issues of trouble with this wrist.  Patient has used ice with minimal relief.    General : 52-year-old patient is awake alert and oriented  HEENT: NCAT  Ext: No acute deformity, however the patient is tender over the radial aspect of her left wrist with decreased range of motion secondary to pain.  She is neurovascular intact in her left hand.  The patient appears to have a ganglion cyst that she is point tender over.  Skin: No rash  Neuro: Cranial nerves II through XII grossly intact as tested.  No acute lateralizing deficits.  Psych: Normal mood and affect      Plan: We will obtain x-rays and treat her with ibuprofen  Patient's x-rays were unremarkable.  I believe the patient has a ganglion cyst.  We will treat her with anti-inflammatories and immobilization and hand surgery follow-up.     Hilario Flores MD  02/10/23 3947

## 2023-12-07 ENCOUNTER — APPOINTMENT (OUTPATIENT)
Dept: CT IMAGING | Facility: HOSPITAL | Age: 53
End: 2023-12-07
Payer: COMMERCIAL

## 2023-12-07 ENCOUNTER — HOSPITAL ENCOUNTER (EMERGENCY)
Facility: HOSPITAL | Age: 53
Discharge: HOME | End: 2023-12-07
Attending: EMERGENCY MEDICINE
Payer: COMMERCIAL

## 2023-12-07 VITALS
HEIGHT: 63 IN | HEART RATE: 72 BPM | RESPIRATION RATE: 18 BRPM | DIASTOLIC BLOOD PRESSURE: 104 MMHG | TEMPERATURE: 97.6 F | OXYGEN SATURATION: 96 % | WEIGHT: 182 LBS | SYSTOLIC BLOOD PRESSURE: 177 MMHG | BODY MASS INDEX: 32.25 KG/M2

## 2023-12-07 DIAGNOSIS — E78.2 MIXED HYPERLIPIDEMIA: ICD-10-CM

## 2023-12-07 DIAGNOSIS — N39.0 ACUTE UTI: Primary | ICD-10-CM

## 2023-12-07 DIAGNOSIS — I10 ESSENTIAL HYPERTENSION: ICD-10-CM

## 2023-12-07 DIAGNOSIS — I10 POORLY-CONTROLLED HYPERTENSION: ICD-10-CM

## 2023-12-07 LAB
ALBUMIN SERPL-MCNC: 4 G/DL (ref 3.5–5.2)
ALBUMIN/GLOB SERPL: 1.1 G/DL
ALP SERPL-CCNC: 88 U/L (ref 39–117)
ALT SERPL W P-5'-P-CCNC: 17 U/L (ref 1–33)
ANION GAP SERPL CALCULATED.3IONS-SCNC: 9.9 MMOL/L (ref 5–15)
AST SERPL-CCNC: 24 U/L (ref 1–32)
BACTERIA UR QL AUTO: ABNORMAL /HPF
BASOPHILS # BLD AUTO: 0.02 10*3/MM3 (ref 0–0.2)
BASOPHILS NFR BLD AUTO: 0.4 % (ref 0–1.5)
BILIRUB SERPL-MCNC: 0.3 MG/DL (ref 0–1.2)
BILIRUB UR QL STRIP: NEGATIVE
BUN SERPL-MCNC: 11 MG/DL (ref 6–20)
BUN/CREAT SERPL: 13.6 (ref 7–25)
CALCIUM SPEC-SCNC: 9.2 MG/DL (ref 8.6–10.5)
CHLORIDE SERPL-SCNC: 105 MMOL/L (ref 98–107)
CLARITY UR: ABNORMAL
CO2 SERPL-SCNC: 25.1 MMOL/L (ref 22–29)
COLOR UR: ABNORMAL
CREAT SERPL-MCNC: 0.81 MG/DL (ref 0.57–1)
DEPRECATED RDW RBC AUTO: 44 FL (ref 37–54)
EGFRCR SERPLBLD CKD-EPI 2021: 88 ML/MIN/1.73
EOSINOPHIL # BLD AUTO: 0.09 10*3/MM3 (ref 0–0.4)
EOSINOPHIL NFR BLD AUTO: 1.7 % (ref 0.3–6.2)
ERYTHROCYTE [DISTWIDTH] IN BLOOD BY AUTOMATED COUNT: 12.3 % (ref 12.3–15.4)
GLOBULIN UR ELPH-MCNC: 3.6 GM/DL
GLUCOSE SERPL-MCNC: 82 MG/DL (ref 65–99)
GLUCOSE UR STRIP-MCNC: NEGATIVE MG/DL
HCT VFR BLD AUTO: 33.6 % (ref 34–46.6)
HGB BLD-MCNC: 11.6 G/DL (ref 12–15.9)
HGB UR QL STRIP.AUTO: ABNORMAL
HYALINE CASTS UR QL AUTO: ABNORMAL /LPF
IMM GRANULOCYTES # BLD AUTO: 0.01 10*3/MM3 (ref 0–0.05)
IMM GRANULOCYTES NFR BLD AUTO: 0.2 % (ref 0–0.5)
KETONES UR QL STRIP: NEGATIVE
LEUKOCYTE ESTERASE UR QL STRIP.AUTO: ABNORMAL
LIPASE SERPL-CCNC: 27 U/L (ref 13–60)
LYMPHOCYTES # BLD AUTO: 2.26 10*3/MM3 (ref 0.7–3.1)
LYMPHOCYTES NFR BLD AUTO: 43.5 % (ref 19.6–45.3)
MCH RBC QN AUTO: 34.5 PG (ref 26.6–33)
MCHC RBC AUTO-ENTMCNC: 34.5 G/DL (ref 31.5–35.7)
MCV RBC AUTO: 100 FL (ref 79–97)
MONOCYTES # BLD AUTO: 0.49 10*3/MM3 (ref 0.1–0.9)
MONOCYTES NFR BLD AUTO: 9.4 % (ref 5–12)
NEUTROPHILS NFR BLD AUTO: 2.33 10*3/MM3 (ref 1.7–7)
NEUTROPHILS NFR BLD AUTO: 44.8 % (ref 42.7–76)
NITRITE UR QL STRIP: POSITIVE
NRBC BLD AUTO-RTO: 0 /100 WBC (ref 0–0.2)
PH UR STRIP.AUTO: <=5 [PH] (ref 5–8)
PLATELET # BLD AUTO: 180 10*3/MM3 (ref 140–450)
PMV BLD AUTO: 11.2 FL (ref 6–12)
POTASSIUM SERPL-SCNC: 3.6 MMOL/L (ref 3.5–5.2)
PROT SERPL-MCNC: 7.6 G/DL (ref 6–8.5)
PROT UR QL STRIP: ABNORMAL
RBC # BLD AUTO: 3.36 10*6/MM3 (ref 3.77–5.28)
RBC # UR STRIP: ABNORMAL /HPF
REF LAB TEST METHOD: ABNORMAL
SODIUM SERPL-SCNC: 140 MMOL/L (ref 136–145)
SP GR UR STRIP: 1.03 (ref 1–1.03)
SQUAMOUS #/AREA URNS HPF: ABNORMAL /HPF
UROBILINOGEN UR QL STRIP: ABNORMAL
WBC # UR STRIP: ABNORMAL /HPF
WBC NRBC COR # BLD AUTO: 5.2 10*3/MM3 (ref 3.4–10.8)

## 2023-12-07 PROCEDURE — 25010000002 LABETALOL 5 MG/ML SOLUTION: Performed by: PHYSICIAN ASSISTANT

## 2023-12-07 PROCEDURE — 83690 ASSAY OF LIPASE: CPT | Performed by: PHYSICIAN ASSISTANT

## 2023-12-07 PROCEDURE — 74177 CT ABD & PELVIS W/CONTRAST: CPT

## 2023-12-07 PROCEDURE — 80053 COMPREHEN METABOLIC PANEL: CPT | Performed by: PHYSICIAN ASSISTANT

## 2023-12-07 PROCEDURE — 81001 URINALYSIS AUTO W/SCOPE: CPT | Performed by: PHYSICIAN ASSISTANT

## 2023-12-07 PROCEDURE — 99285 EMERGENCY DEPT VISIT HI MDM: CPT

## 2023-12-07 PROCEDURE — 87086 URINE CULTURE/COLONY COUNT: CPT | Performed by: PHYSICIAN ASSISTANT

## 2023-12-07 PROCEDURE — 96374 THER/PROPH/DIAG INJ IV PUSH: CPT

## 2023-12-07 PROCEDURE — 25510000001 IOPAMIDOL 61 % SOLUTION: Performed by: EMERGENCY MEDICINE

## 2023-12-07 PROCEDURE — 85025 COMPLETE CBC W/AUTO DIFF WBC: CPT | Performed by: PHYSICIAN ASSISTANT

## 2023-12-07 RX ORDER — AMLODIPINE BESYLATE 5 MG/1
10 TABLET ORAL ONCE
Status: COMPLETED | OUTPATIENT
Start: 2023-12-07 | End: 2023-12-07

## 2023-12-07 RX ORDER — LABETALOL HYDROCHLORIDE 5 MG/ML
20 INJECTION, SOLUTION INTRAVENOUS ONCE
Status: COMPLETED | OUTPATIENT
Start: 2023-12-07 | End: 2023-12-07

## 2023-12-07 RX ORDER — CEPHALEXIN 500 MG/1
500 CAPSULE ORAL 3 TIMES DAILY
Qty: 21 CAPSULE | Refills: 0 | Status: SHIPPED | OUTPATIENT
Start: 2023-12-07

## 2023-12-07 RX ORDER — AMLODIPINE BESYLATE 10 MG/1
10 TABLET ORAL DAILY
Qty: 90 TABLET | Refills: 0 | Status: SHIPPED | OUTPATIENT
Start: 2023-12-07

## 2023-12-07 RX ORDER — ATORVASTATIN CALCIUM 20 MG/1
20 TABLET, FILM COATED ORAL NIGHTLY
Qty: 90 TABLET | Refills: 0 | Status: SHIPPED | OUTPATIENT
Start: 2023-12-07

## 2023-12-07 RX ORDER — SODIUM CHLORIDE 0.9 % (FLUSH) 0.9 %
10 SYRINGE (ML) INJECTION AS NEEDED
Status: DISCONTINUED | OUTPATIENT
Start: 2023-12-07 | End: 2023-12-07 | Stop reason: HOSPADM

## 2023-12-07 RX ADMIN — LABETALOL HYDROCHLORIDE 20 MG: 5 INJECTION, SOLUTION INTRAVENOUS at 14:09

## 2023-12-07 RX ADMIN — AMLODIPINE BESYLATE 10 MG: 5 TABLET ORAL at 12:05

## 2023-12-07 RX ADMIN — IOPAMIDOL 85 ML: 612 INJECTION, SOLUTION INTRAVENOUS at 13:35

## 2023-12-07 NOTE — ED PROVIDER NOTES
EMERGENCY DEPARTMENT ENCOUNTER    Room Number:  08/08  Date of encounter:  12/7/2023  PCP: Provider, No Known  Historian: Patient  Chronic or social conditions impacting care (social determinants of health): Nothing    HPI:  Chief Complaint: Abdominal pain  A complete HPI/ROS/PMH/PSH/SH/FH are unobtainable due to: Nothing    Context: Maira Benson is a 53 y.o. female who presents to the ED c/o 2-day history of gradual onset suprapubic abdominal pain, as well as hematuria.  Patient states the pain started yesterday afternoon.  She has noticed blood in her urine.  She denies any overt dysuria or polyuria.  She denies any fever, nausea, vomiting.  The pain does not seem to radiate.  There is no precipitating or alleviating factors.  Patient is approximately 2 months postoperative laparoscopic appendectomy.    Review of prior external notes (non-ED):   I reviewed general surgery office visit from 10/6/2023.  Patient being seen for ventral hernia.    Review of prior external test results outside of this encounter:  I reviewed CT scan from 9/18/2023.  This showed appendicitis.    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    Class 1 obesity due to excess calories with serious comorbidity and body mass index (BMI) of 32.0 to 32.9 in adult 09/19/2020    Radial styloid tenosynovitis of right hand 06/11/2021    Trochanteric bursitis, left hip 04/09/2018    Mixed hyperlipidemia 11/12/2021     Resolved Ambulatory Problems     Diagnosis Date Noted    No Resolved Ambulatory Problems     Past Medical History:   Diagnosis Date    Anemia 1989    Glaucoma 2000    Hypertension     Irritable bowel syndrome     Seizures 2003         PAST SURGICAL HISTORY  Past Surgical History:   Procedure Laterality Date    ANKLE SURGERY      CARPAL TUNNEL RELEASE      EYE  SURGERY  2013    GLAUCOMA SURGERY      TUBAL ABDOMINAL LIGATION           FAMILY HISTORY  Family History   Problem Relation Age of Onset    Cancer Mother     Diabetes Father     Cancer Maternal Aunt     Cancer Maternal Grandmother     Cancer Maternal Grandfather     Diabetes Paternal Grandmother          SOCIAL HISTORY  Social History     Socioeconomic History    Marital status: Single   Tobacco Use    Smoking status: Former     Packs/day: 0.00     Years: 1.00     Additional pack years: 0.00     Total pack years: 0.00     Types: Cigarettes     Start date: 1/3/1980     Quit date: 1981     Years since quittin.8    Smokeless tobacco: Never    Tobacco comments:     rare   Vaping Use    Vaping Use: Never used   Substance and Sexual Activity    Alcohol use: Not Currently     Comment: rare    Drug use: Never    Sexual activity: Not Currently     Partners: Male     Birth control/protection: Surgical         ALLERGIES  Codeine, Lisinopril, and Compazine [prochlorperazine]        REVIEW OF SYSTEMS  All systems reviewed and negative except for those discussed in HPI.       PHYSICAL EXAM    I have reviewed the triage vital signs and nursing notes.    ED Triage Vitals   Temp Heart Rate Resp BP SpO2   23 1029 23 1029 23 1029 23 1030 23 1029   97.6 °F (36.4 °C) 79 18 (!) 194/102 95 %      Temp src Heart Rate Source Patient Position BP Location FiO2 (%)   23 1029 23 1029 23 1030 23 1030 --   Tympanic Monitor Sitting Right arm        Physical Exam  GENERAL: Alert, oriented, nontoxic-appearing, not distressed  HENT: head atraumatic, no nuchal rigidity  EYES: no scleral icterus, EOMI  CV: regular rhythm, regular rate, no murmur  RESPIRATORY: normal effort, CTA  ABDOMEN: soft, mild suprapubic abdominal tenderness slight guarding or rebound.  No CVA tenderness.  MUSCULOSKELETAL: no deformity, FROM, no calf swelling or tenderness  NEURO: alert, moves all extremities, follows  commands  SKIN: warm, dry        LAB RESULTS  Recent Results (from the past 24 hour(s))   Urinalysis With Microscopic If Indicated (No Culture) - Urine, Clean Catch    Collection Time: 12/07/23 11:26 AM    Specimen: Urine, Clean Catch   Result Value Ref Range    Color, UA Red (A) Yellow, Straw    Appearance, UA Turbid (A) Clear    pH, UA <=5.0 5.0 - 8.0    Specific Gravity, UA 1.028 1.005 - 1.030    Glucose, UA Negative Negative    Ketones, UA Negative Negative    Bilirubin, UA Negative Negative    Blood, UA Moderate (2+) (A) Negative    Protein,  mg/dL (2+) (A) Negative    Leuk Esterase, UA Moderate (2+) (A) Negative    Nitrite, UA Positive (A) Negative    Urobilinogen, UA 1.0 E.U./dL 0.2 - 1.0 E.U./dL   Urinalysis, Microscopic Only - Urine, Clean Catch    Collection Time: 12/07/23 11:26 AM    Specimen: Urine, Clean Catch   Result Value Ref Range    RBC, UA Too Numerous to Count (A) None Seen, 0-2 /HPF    WBC, UA 3-5 (A) None Seen, 0-2 /HPF    Bacteria, UA Trace (A) None Seen /HPF    Squamous Epithelial Cells, UA None Seen None Seen, 0-2 /HPF    Hyaline Casts, UA None Seen None Seen /LPF    Methodology Manual Light Microscopy    Comprehensive Metabolic Panel    Collection Time: 12/07/23 11:42 AM    Specimen: Blood   Result Value Ref Range    Glucose 82 65 - 99 mg/dL    BUN 11 6 - 20 mg/dL    Creatinine 0.81 0.57 - 1.00 mg/dL    Sodium 140 136 - 145 mmol/L    Potassium 3.6 3.5 - 5.2 mmol/L    Chloride 105 98 - 107 mmol/L    CO2 25.1 22.0 - 29.0 mmol/L    Calcium 9.2 8.6 - 10.5 mg/dL    Total Protein 7.6 6.0 - 8.5 g/dL    Albumin 4.0 3.5 - 5.2 g/dL    ALT (SGPT) 17 1 - 33 U/L    AST (SGOT) 24 1 - 32 U/L    Alkaline Phosphatase 88 39 - 117 U/L    Total Bilirubin 0.3 0.0 - 1.2 mg/dL    Globulin 3.6 gm/dL    A/G Ratio 1.1 g/dL    BUN/Creatinine Ratio 13.6 7.0 - 25.0    Anion Gap 9.9 5.0 - 15.0 mmol/L    eGFR 88.0 >60.0 mL/min/1.73   Lipase    Collection Time: 12/07/23 11:42 AM    Specimen: Blood   Result Value Ref  Range    Lipase 27 13 - 60 U/L   CBC Auto Differential    Collection Time: 12/07/23 11:42 AM    Specimen: Blood   Result Value Ref Range    WBC 5.20 3.40 - 10.80 10*3/mm3    RBC 3.36 (L) 3.77 - 5.28 10*6/mm3    Hemoglobin 11.6 (L) 12.0 - 15.9 g/dL    Hematocrit 33.6 (L) 34.0 - 46.6 %    .0 (H) 79.0 - 97.0 fL    MCH 34.5 (H) 26.6 - 33.0 pg    MCHC 34.5 31.5 - 35.7 g/dL    RDW 12.3 12.3 - 15.4 %    RDW-SD 44.0 37.0 - 54.0 fl    MPV 11.2 6.0 - 12.0 fL    Platelets 180 140 - 450 10*3/mm3    Neutrophil % 44.8 42.7 - 76.0 %    Lymphocyte % 43.5 19.6 - 45.3 %    Monocyte % 9.4 5.0 - 12.0 %    Eosinophil % 1.7 0.3 - 6.2 %    Basophil % 0.4 0.0 - 1.5 %    Immature Grans % 0.2 0.0 - 0.5 %    Neutrophils, Absolute 2.33 1.70 - 7.00 10*3/mm3    Lymphocytes, Absolute 2.26 0.70 - 3.10 10*3/mm3    Monocytes, Absolute 0.49 0.10 - 0.90 10*3/mm3    Eosinophils, Absolute 0.09 0.00 - 0.40 10*3/mm3    Basophils, Absolute 0.02 0.00 - 0.20 10*3/mm3    Immature Grans, Absolute 0.01 0.00 - 0.05 10*3/mm3    nRBC 0.0 0.0 - 0.2 /100 WBC       Ordered the above labs and independently reviewed the results.        RADIOLOGY  CT Abdomen Pelvis With Contrast    Result Date: 12/7/2023  EXAMINATION: CT OF THE ABDOMEN AND PELVIS WITH CONTRAST  TECHNIQUE: Computed tomography of the abdomen and pelvis after the uneventful administration of nonionic intravenous contrast per protocol. Radiation dose reduction techniques were utilized, including automated exposure control and exposure modulation based on body size.  HISTORY: Periumbilical abdominal pain and hematuria  COMPARISON: None available  FINDINGS: Limited evaluation of the inferior thorax demonstrates atelectasis, without consolidation, pleural effusion, or pneumothorax. The heart is normal in size and configuration, without pericardial effusion.  There is a small sliding-type hiatal hernia. An appendicolith is seen, without evidence of acute appendicitis.  The liver, gallbladder, spleen,  adrenal glands, kidneys, pancreas, small bowel, large bowel, uterus, urinary bladder, and abdominal vasculature are normal. There is a left-sided fat-containing inguinal hernia. No intraperitoneal fluid collection or free gas are seen. No enlarged lymph nodes are demonstrated.  Bone windows demonstrate degenerative changes, without suspicious osseous lesion seen.      Incidental findings as above, without acute or suspicious intraperitoneal process seen.  This report was finalized on 12/7/2023 2:30 PM by Dr. Dereje Barrera M.D on Workstation: [id number]       I ordered the above noted radiological studies. Reviewed by me and discussed with radiologist.  See dictation for official radiology interpretation.      MEDICATIONS GIVEN IN ER    Medications   sodium chloride 0.9 % flush 10 mL (has no administration in time range)   amLODIPine (NORVASC) tablet 10 mg (10 mg Oral Given 12/7/23 1205)   iopamidol (ISOVUE-300) 61 % injection 85 mL (85 mL Intravenous Given by Other 12/7/23 1335)   labetalol (NORMODYNE,TRANDATE) injection 20 mg (20 mg Intravenous Given 12/7/23 1409)         ADDITIONAL ORDERS CONSIDERED BUT NOT ORDERED:  Nothing      PROGRESS, DATA ANALYSIS, CONSULTS, AND MEDICAL DECISION MAKING    All labs have been independently interpreted by myself.  All radiology studies have been independently interpreted by myself and discussed with radiologist dictating the report.   EKG's independently interpreted by myself.  Discussion below represents my analysis of pertinent findings related to patient's condition, differential diagnosis, treatment plan and final disposition.    I have discussed case with Dr. Schroeder, emergency room physician.  He has performed his own bedside examination and agrees with treatment plan.    ED Course as of 12/07/23 1521   Thu Dec 07, 2023   1117 Patient presents with 2-day history of periumbilical abdominal pain, hematuria.  Differential diagnoses include but not limited to  ureterolithiasis, acute UTI, colitis. [EE]   1210 WBC: 5.20 [EE]   1210 Hemoglobin(!): 11.6 [EE]   1210 Blood, UA(!): Moderate (2+) [EE]   1210 Leukocytes, UA(!): Moderate (2+) [EE]   1210 Nitrite, UA(!): Positive [EE]   1345 CT Abdomen Pelvis With Contrast  My independent interpretation of the abdomen pelvis is no bowel obstruction [TR]   1435 CT imaging shows no evidence of surgical abdomen.  Plan to treat the patient for UTI. [EE]   1438 Blood pressure improved.  She states has been out of pressure medicine for at least 1 month.  We will refill her Norvasc and give her a new primary care physician to see. [EE]      ED Course User Index  [EE] Arnol Laureano PA  [TR] Ajit Schroeder MD       AS OF 15:21 EST VITALS:    BP - (!) 177/104  HR - 72  TEMP - 97.6 °F (36.4 °C) (Tympanic)  O2 SATS - 96%        DIAGNOSIS  Final diagnoses:   Acute UTI   Poorly-controlled hypertension         DISPOSITION  Discharged      Dictated utilizing Dragon dictation     Arnol Laureano PA  12/07/23 8505

## 2023-12-07 NOTE — Clinical Note
[facility] EMERGENCY DEPARTMENT  [address]  [facility] [address]  Phone: [phone]    Maira Benson was seen and treated in our emergency department on 12/7/2023.  She may return to work on 12/08/2023.         Thank you for choosing [facility].    Ajit Schroeder MD

## 2023-12-07 NOTE — ED PROVIDER NOTES
MD ATTESTATION NOTE    The VIVI and I have discussed this patient's history, physical exam, and treatment plan.  I have reviewed the documentation and personally had a face to face interaction with the patient. I affirm the documentation and agree with the treatment and plan.  The attached note describes my personal findings.    I provided a substantive portion of the care of this patient. I personally performed the physical exam, in its entirety.    Independent Historians: Patient    A complete HPI/ROS/PMH/PSH/SH/FH are unobtainable due to: Nothing    Chronic or social conditions impacting patient care (social determinants of health): None    Maira Benson is a 53 y.o. female who presents to the ED c/o acute abdominal pain with hematuria.  The patient reports for the last 2 days she has had some periumbilical abdominal pain.  She reports she previously had her appendix removed 8 weeks ago.  She reports she noticed some blood and was not sure if she was having hematuria or vaginal bleeding.  She denies any nausea or vomiting or fever.  She reports she has not had a period in 4 years.      Review of prior external notes (non-ED) -and- Review of prior external test results outside of this encounter: [facility] evaluation 11/21/2022 shows normal CMP, normal CBC    Prescription drug monitoring program review:        On exam:  GENERAL: Awake, alert, no acute distress  SKIN: Warm, dry  HENT: Normocephalic, atraumatic  EYES: no scleral icterus  CV: regular rhythm, regular rate  RESPIRATORY: normal effort, lungs clear  ABDOMEN: soft, mild periumbilical tenderness, nondistended  MUSCULOSKELETAL: no deformity  NEURO: alert, moves all extremities, follows commands                                                             Labs  Recent Results (from the past 24 hour(s))   Urinalysis With Microscopic If Indicated (No Culture) - Urine, Clean Catch    Collection Time: 12/07/23 11:26 AM    Specimen: Urine, Clean Catch   Result Value Ref  Range    Color, UA Red (A) Yellow, Straw    Appearance, UA Turbid (A) Clear    pH, UA <=5.0 5.0 - 8.0    Specific Gravity, UA 1.028 1.005 - 1.030    Glucose, UA Negative Negative    Ketones, UA Negative Negative    Bilirubin, UA Negative Negative    Blood, UA Moderate (2+) (A) Negative    Protein,  mg/dL (2+) (A) Negative    Leuk Esterase, UA Moderate (2+) (A) Negative    Nitrite, UA Positive (A) Negative    Urobilinogen, UA 1.0 E.U./dL 0.2 - 1.0 E.U./dL   Urinalysis, Microscopic Only - Urine, Clean Catch    Collection Time: 12/07/23 11:26 AM    Specimen: Urine, Clean Catch   Result Value Ref Range    RBC, UA Too Numerous to Count (A) None Seen, 0-2 /HPF    WBC, UA 3-5 (A) None Seen, 0-2 /HPF    Bacteria, UA Trace (A) None Seen /HPF    Squamous Epithelial Cells, UA None Seen None Seen, 0-2 /HPF    Hyaline Casts, UA None Seen None Seen /LPF    Methodology Manual Light Microscopy    Comprehensive Metabolic Panel    Collection Time: 12/07/23 11:42 AM    Specimen: Blood   Result Value Ref Range    Glucose 82 65 - 99 mg/dL    BUN 11 6 - 20 mg/dL    Creatinine 0.81 0.57 - 1.00 mg/dL    Sodium 140 136 - 145 mmol/L    Potassium 3.6 3.5 - 5.2 mmol/L    Chloride 105 98 - 107 mmol/L    CO2 25.1 22.0 - 29.0 mmol/L    Calcium 9.2 8.6 - 10.5 mg/dL    Total Protein 7.6 6.0 - 8.5 g/dL    Albumin 4.0 3.5 - 5.2 g/dL    ALT (SGPT) 17 1 - 33 U/L    AST (SGOT) 24 1 - 32 U/L    Alkaline Phosphatase 88 39 - 117 U/L    Total Bilirubin 0.3 0.0 - 1.2 mg/dL    Globulin 3.6 gm/dL    A/G Ratio 1.1 g/dL    BUN/Creatinine Ratio 13.6 7.0 - 25.0    Anion Gap 9.9 5.0 - 15.0 mmol/L    eGFR 88.0 >60.0 mL/min/1.73   Lipase    Collection Time: 12/07/23 11:42 AM    Specimen: Blood   Result Value Ref Range    Lipase 27 13 - 60 U/L   CBC Auto Differential    Collection Time: 12/07/23 11:42 AM    Specimen: Blood   Result Value Ref Range    WBC 5.20 3.40 - 10.80 10*3/mm3    RBC 3.36 (L) 3.77 - 5.28 10*6/mm3    Hemoglobin 11.6 (L) 12.0 - 15.9 g/dL     Hematocrit 33.6 (L) 34.0 - 46.6 %    .0 (H) 79.0 - 97.0 fL    MCH 34.5 (H) 26.6 - 33.0 pg    MCHC 34.5 31.5 - 35.7 g/dL    RDW 12.3 12.3 - 15.4 %    RDW-SD 44.0 37.0 - 54.0 fl    MPV 11.2 6.0 - 12.0 fL    Platelets 180 140 - 450 10*3/mm3    Neutrophil % 44.8 42.7 - 76.0 %    Lymphocyte % 43.5 19.6 - 45.3 %    Monocyte % 9.4 5.0 - 12.0 %    Eosinophil % 1.7 0.3 - 6.2 %    Basophil % 0.4 0.0 - 1.5 %    Immature Grans % 0.2 0.0 - 0.5 %    Neutrophils, Absolute 2.33 1.70 - 7.00 10*3/mm3    Lymphocytes, Absolute 2.26 0.70 - 3.10 10*3/mm3    Monocytes, Absolute 0.49 0.10 - 0.90 10*3/mm3    Eosinophils, Absolute 0.09 0.00 - 0.40 10*3/mm3    Basophils, Absolute 0.02 0.00 - 0.20 10*3/mm3    Immature Grans, Absolute 0.01 0.00 - 0.05 10*3/mm3    nRBC 0.0 0.0 - 0.2 /100 WBC       Radiology  CT Abdomen Pelvis With Contrast    Result Date: 12/7/2023  EXAMINATION: CT OF THE ABDOMEN AND PELVIS WITH CONTRAST  TECHNIQUE: Computed tomography of the abdomen and pelvis after the uneventful administration of nonionic intravenous contrast per protocol. Radiation dose reduction techniques were utilized, including automated exposure control and exposure modulation based on body size.  HISTORY: Periumbilical abdominal pain and hematuria  COMPARISON: None available  FINDINGS: Limited evaluation of the inferior thorax demonstrates atelectasis, without consolidation, pleural effusion, or pneumothorax. The heart is normal in size and configuration, without pericardial effusion.  There is a small sliding-type hiatal hernia. An appendicolith is seen, without evidence of acute appendicitis.  The liver, gallbladder, spleen, adrenal glands, kidneys, pancreas, small bowel, large bowel, uterus, urinary bladder, and abdominal vasculature are normal. There is a left-sided fat-containing inguinal hernia. No intraperitoneal fluid collection or free gas are seen. No enlarged lymph nodes are demonstrated.  Bone windows demonstrate degenerative  changes, without suspicious osseous lesion seen.      Incidental findings as above, without acute or suspicious intraperitoneal process seen.  This report was finalized on 12/7/2023 2:30 PM by Dr. Dereje Barrera M.D on Workstation: [id number]       Medical Decision Making:  ED Course as of 12/07/23 1609   Thu Dec 07, 2023   1117 Patient presents with 2-day history of periumbilical abdominal pain, hematuria.  Differential diagnoses include but not limited to ureterolithiasis, acute UTI, colitis. [EE]   1210 WBC: 5.20 [EE]   1210 Hemoglobin(!): 11.6 [EE]   1210 Blood, UA(!): Moderate (2+) [EE]   1210 Leukocytes, UA(!): Moderate (2+) [EE]   1210 Nitrite, UA(!): Positive [EE]   1345 CT Abdomen Pelvis With Contrast  My independent interpretation of the abdomen pelvis is no bowel obstruction [TR]   1435 CT imaging shows no evidence of surgical abdomen.  Plan to treat the patient for UTI. [EE]   1438 Blood pressure improved.  She states has been out of pressure medicine for at least 1 month.  We will refill her Norvasc and give her a new primary care physician to see. [EE]      ED Course User Index  [EE] Arnol Laureano PA  [TR] Ajit Schroeder MD       Clinical Scores:              The patient presents with acute abdominal pain.  She is 8 weeks postop from appendectomy.  She does report some blood in her urine or vaginal bleeding.  Plan obtain chemistries and blood counts as well as urinalysis.  Will obtain CT imaging of her abdomen pelvis given her recent surgery and acute abdominal pain.  She does have some uncontrolled hypertension here so we will give her some medicine to help control her blood pressure.  My differential diagnosis includes postoperative complication, UTI, kidney stone, vaginal bleeding, and others.    Procedures:  Procedures                  Diagnosis  Final diagnoses:   Acute UTI   Poorly-controlled hypertension       Note Disclaimer: At [facility], we believe that sharing information builds  trust and better relationships. You are receiving this note because you recently visited [facility]. It is possible you will see health information before a provider has talked with you about it. This kind of information can be easy to misunderstand. To help you fully understand what it means for your health, we urge you to discuss this note with your provider.       Ajit Schroeder MD  12/07/23 4120

## 2023-12-08 LAB — BACTERIA SPEC AEROBE CULT: NORMAL
